# Patient Record
Sex: FEMALE | Race: WHITE | Employment: OTHER
[De-identification: names, ages, dates, MRNs, and addresses within clinical notes are randomized per-mention and may not be internally consistent; named-entity substitution may affect disease eponyms.]

---

## 2023-10-31 ENCOUNTER — APPOINTMENT (OUTPATIENT)
Facility: HOSPITAL | Age: 63
DRG: 481 | End: 2023-10-31
Payer: COMMERCIAL

## 2023-10-31 ENCOUNTER — HOSPITAL ENCOUNTER (INPATIENT)
Facility: HOSPITAL | Age: 63
LOS: 5 days | Discharge: HOME HEALTH | DRG: 481 | End: 2023-11-05
Attending: EMERGENCY MEDICINE | Admitting: STUDENT IN AN ORGANIZED HEALTH CARE EDUCATION/TRAINING PROGRAM
Payer: COMMERCIAL

## 2023-10-31 DIAGNOSIS — S72.432B: Primary | ICD-10-CM

## 2023-10-31 DIAGNOSIS — S72.422B: Primary | ICD-10-CM

## 2023-10-31 DIAGNOSIS — E87.1 HYPONATREMIA: ICD-10-CM

## 2023-10-31 DIAGNOSIS — G89.18 ACUTE POST-OPERATIVE PAIN: ICD-10-CM

## 2023-10-31 PROBLEM — S72.442B: Status: ACTIVE | Noted: 2023-10-31

## 2023-10-31 PROBLEM — S72.352D CLOSED DISPLACED COMMINUTED FRACTURE OF SHAFT OF LEFT FEMUR WITH ROUTINE HEALING: Status: ACTIVE | Noted: 2023-10-31

## 2023-10-31 LAB
ALBUMIN SERPL-MCNC: 3.9 G/DL (ref 3.5–5)
ALBUMIN/GLOB SERPL: 1.1 (ref 1.1–2.2)
ALP SERPL-CCNC: 82 U/L (ref 45–117)
ALT SERPL-CCNC: 31 U/L (ref 12–78)
ANION GAP SERPL CALC-SCNC: 6 MMOL/L (ref 5–15)
APTT PPP: 25.6 SEC (ref 22.1–31)
AST SERPL-CCNC: 21 U/L (ref 15–37)
BASOPHILS # BLD: 0 K/UL (ref 0–0.1)
BASOPHILS NFR BLD: 0 % (ref 0–1)
BILIRUB SERPL-MCNC: 0.5 MG/DL (ref 0.2–1)
BUN SERPL-MCNC: 7 MG/DL (ref 6–20)
BUN/CREAT SERPL: 12 (ref 12–20)
CALCIUM SERPL-MCNC: 8.9 MG/DL (ref 8.5–10.1)
CHLORIDE SERPL-SCNC: 98 MMOL/L (ref 97–108)
CO2 SERPL-SCNC: 26 MMOL/L (ref 21–32)
COMMENT:: NORMAL
CREAT SERPL-MCNC: 0.58 MG/DL (ref 0.55–1.02)
DIFFERENTIAL METHOD BLD: ABNORMAL
EOSINOPHIL # BLD: 0.1 K/UL (ref 0–0.4)
EOSINOPHIL NFR BLD: 1 % (ref 0–7)
ERYTHROCYTE [DISTWIDTH] IN BLOOD BY AUTOMATED COUNT: 12.3 % (ref 11.5–14.5)
GLOBULIN SER CALC-MCNC: 3.6 G/DL (ref 2–4)
GLUCOSE BLD STRIP.AUTO-MCNC: 289 MG/DL (ref 65–117)
GLUCOSE BLD STRIP.AUTO-MCNC: 296 MG/DL (ref 65–117)
GLUCOSE BLD STRIP.AUTO-MCNC: 323 MG/DL (ref 65–117)
GLUCOSE SERPL-MCNC: 178 MG/DL (ref 65–100)
HCT VFR BLD AUTO: 33.3 % (ref 35–47)
HGB BLD-MCNC: 11.8 G/DL (ref 11.5–16)
IMM GRANULOCYTES # BLD AUTO: 0 K/UL (ref 0–0.04)
IMM GRANULOCYTES NFR BLD AUTO: 0 % (ref 0–0.5)
INR PPP: 1 (ref 0.9–1.1)
LYMPHOCYTES # BLD: 0.9 K/UL (ref 0.8–3.5)
LYMPHOCYTES NFR BLD: 12 % (ref 12–49)
MAGNESIUM SERPL-MCNC: 2 MG/DL (ref 1.6–2.4)
MCH RBC QN AUTO: 29.9 PG (ref 26–34)
MCHC RBC AUTO-ENTMCNC: 35.4 G/DL (ref 30–36.5)
MCV RBC AUTO: 84.3 FL (ref 80–99)
MONOCYTES # BLD: 0.4 K/UL (ref 0–1)
MONOCYTES NFR BLD: 6 % (ref 5–13)
NEUTS SEG # BLD: 6.1 K/UL (ref 1.8–8)
NEUTS SEG NFR BLD: 81 % (ref 32–75)
NRBC # BLD: 0 K/UL (ref 0–0.01)
NRBC BLD-RTO: 0 PER 100 WBC
PLATELET # BLD AUTO: 191 K/UL (ref 150–400)
PMV BLD AUTO: 10.1 FL (ref 8.9–12.9)
POTASSIUM SERPL-SCNC: 3.9 MMOL/L (ref 3.5–5.1)
PROT SERPL-MCNC: 7.5 G/DL (ref 6.4–8.2)
PROTHROMBIN TIME: 10.3 SEC (ref 9–11.1)
RBC # BLD AUTO: 3.95 M/UL (ref 3.8–5.2)
SERVICE CMNT-IMP: ABNORMAL
SODIUM SERPL-SCNC: 130 MMOL/L (ref 136–145)
SPECIMEN HOLD: NORMAL
THERAPEUTIC RANGE: NORMAL SECS (ref 58–77)
WBC # BLD AUTO: 7.5 K/UL (ref 3.6–11)

## 2023-10-31 PROCEDURE — 36415 COLL VENOUS BLD VENIPUNCTURE: CPT

## 2023-10-31 PROCEDURE — 27502 TREATMENT OF THIGH FRACTURE: CPT

## 2023-10-31 PROCEDURE — 2500000003 HC RX 250 WO HCPCS

## 2023-10-31 PROCEDURE — 85610 PROTHROMBIN TIME: CPT

## 2023-10-31 PROCEDURE — 2580000003 HC RX 258

## 2023-10-31 PROCEDURE — 85025 COMPLETE CBC W/AUTO DIFF WBC: CPT

## 2023-10-31 PROCEDURE — 6370000000 HC RX 637 (ALT 250 FOR IP)

## 2023-10-31 PROCEDURE — 6360000002 HC RX W HCPCS

## 2023-10-31 PROCEDURE — 6370000000 HC RX 637 (ALT 250 FOR IP): Performed by: STUDENT IN AN ORGANIZED HEALTH CARE EDUCATION/TRAINING PROGRAM

## 2023-10-31 PROCEDURE — 73560 X-RAY EXAM OF KNEE 1 OR 2: CPT

## 2023-10-31 PROCEDURE — 90471 IMMUNIZATION ADMIN: CPT

## 2023-10-31 PROCEDURE — 96375 TX/PRO/DX INJ NEW DRUG ADDON: CPT

## 2023-10-31 PROCEDURE — 96365 THER/PROPH/DIAG IV INF INIT: CPT

## 2023-10-31 PROCEDURE — 6360000002 HC RX W HCPCS: Performed by: EMERGENCY MEDICINE

## 2023-10-31 PROCEDURE — 73706 CT ANGIO LWR EXTR W/O&W/DYE: CPT

## 2023-10-31 PROCEDURE — 99285 EMERGENCY DEPT VISIT HI MDM: CPT

## 2023-10-31 PROCEDURE — 1100000003 HC PRIVATE W/ TELEMETRY

## 2023-10-31 PROCEDURE — 80053 COMPREHEN METABOLIC PANEL: CPT

## 2023-10-31 PROCEDURE — 2580000003 HC RX 258: Performed by: STUDENT IN AN ORGANIZED HEALTH CARE EDUCATION/TRAINING PROGRAM

## 2023-10-31 PROCEDURE — 82962 GLUCOSE BLOOD TEST: CPT

## 2023-10-31 PROCEDURE — 73552 X-RAY EXAM OF FEMUR 2/>: CPT

## 2023-10-31 PROCEDURE — 6360000002 HC RX W HCPCS: Performed by: STUDENT IN AN ORGANIZED HEALTH CARE EDUCATION/TRAINING PROGRAM

## 2023-10-31 PROCEDURE — 2580000003 HC RX 258: Performed by: EMERGENCY MEDICINE

## 2023-10-31 PROCEDURE — 6360000004 HC RX CONTRAST MEDICATION: Performed by: EMERGENCY MEDICINE

## 2023-10-31 PROCEDURE — 96366 THER/PROPH/DIAG IV INF ADDON: CPT

## 2023-10-31 PROCEDURE — 83735 ASSAY OF MAGNESIUM: CPT

## 2023-10-31 PROCEDURE — 85730 THROMBOPLASTIN TIME PARTIAL: CPT

## 2023-10-31 PROCEDURE — 93005 ELECTROCARDIOGRAM TRACING: CPT

## 2023-10-31 PROCEDURE — 90714 TD VACC NO PRESV 7 YRS+ IM: CPT

## 2023-10-31 RX ORDER — SODIUM CHLORIDE 9 MG/ML
INJECTION, SOLUTION INTRAVENOUS PRN
Status: DISCONTINUED | OUTPATIENT
Start: 2023-10-31 | End: 2023-11-04 | Stop reason: SDUPTHER

## 2023-10-31 RX ORDER — FENTANYL CITRATE 50 UG/ML
50 INJECTION, SOLUTION INTRAMUSCULAR; INTRAVENOUS
Status: COMPLETED | OUTPATIENT
Start: 2023-10-31 | End: 2023-10-31

## 2023-10-31 RX ORDER — SODIUM CHLORIDE 9 MG/ML
125 INJECTION, SOLUTION INTRAVENOUS ONCE
Status: COMPLETED | OUTPATIENT
Start: 2023-10-31 | End: 2023-10-31

## 2023-10-31 RX ORDER — LORAZEPAM 2 MG/ML
2 INJECTION INTRAMUSCULAR EVERY 6 HOURS PRN
Status: DISCONTINUED | OUTPATIENT
Start: 2023-10-31 | End: 2023-11-05 | Stop reason: HOSPADM

## 2023-10-31 RX ORDER — DULOXETIN HYDROCHLORIDE 60 MG/1
60 CAPSULE, DELAYED RELEASE ORAL DAILY
COMMUNITY

## 2023-10-31 RX ORDER — ACETAMINOPHEN 325 MG/1
650 TABLET ORAL EVERY 6 HOURS PRN
Status: DISCONTINUED | OUTPATIENT
Start: 2023-10-31 | End: 2023-11-05 | Stop reason: HOSPADM

## 2023-10-31 RX ORDER — GABAPENTIN 100 MG/1
1 CAPSULE ORAL 2 TIMES DAILY
COMMUNITY

## 2023-10-31 RX ORDER — ENOXAPARIN SODIUM 100 MG/ML
40 INJECTION SUBCUTANEOUS DAILY
Status: DISCONTINUED | OUTPATIENT
Start: 2023-10-31 | End: 2023-11-01

## 2023-10-31 RX ORDER — SODIUM CHLORIDE 0.9 % (FLUSH) 0.9 %
5-40 SYRINGE (ML) INJECTION EVERY 12 HOURS SCHEDULED
Status: DISCONTINUED | OUTPATIENT
Start: 2023-10-31 | End: 2023-11-01

## 2023-10-31 RX ORDER — ONDANSETRON 2 MG/ML
4 INJECTION INTRAMUSCULAR; INTRAVENOUS EVERY 6 HOURS PRN
Status: DISCONTINUED | OUTPATIENT
Start: 2023-10-31 | End: 2023-11-05 | Stop reason: HOSPADM

## 2023-10-31 RX ORDER — TETANUS AND DIPHTHERIA TOXOIDS ADSORBED 2; 2 [LF]/.5ML; [LF]/.5ML
0.5 INJECTION INTRAMUSCULAR ONCE
Status: COMPLETED | OUTPATIENT
Start: 2023-10-31 | End: 2023-10-31

## 2023-10-31 RX ORDER — SODIUM CHLORIDE 0.9 % (FLUSH) 0.9 %
5-40 SYRINGE (ML) INJECTION PRN
Status: DISCONTINUED | OUTPATIENT
Start: 2023-10-31 | End: 2023-11-05 | Stop reason: HOSPADM

## 2023-10-31 RX ORDER — DIAZEPAM 5 MG/ML
2.5 INJECTION, SOLUTION INTRAMUSCULAR; INTRAVENOUS EVERY 4 HOURS PRN
Status: DISCONTINUED | OUTPATIENT
Start: 2023-10-31 | End: 2023-11-05 | Stop reason: HOSPADM

## 2023-10-31 RX ORDER — DEXTROSE MONOHYDRATE 100 MG/ML
INJECTION, SOLUTION INTRAVENOUS CONTINUOUS PRN
Status: DISCONTINUED | OUTPATIENT
Start: 2023-10-31 | End: 2023-11-05 | Stop reason: HOSPADM

## 2023-10-31 RX ORDER — GAUZE BANDAGE 2" X 2"
100 BANDAGE TOPICAL DAILY
Status: DISCONTINUED | OUTPATIENT
Start: 2023-10-31 | End: 2023-11-05 | Stop reason: HOSPADM

## 2023-10-31 RX ORDER — TRAZODONE HYDROCHLORIDE 100 MG/1
100 TABLET ORAL NIGHTLY
COMMUNITY

## 2023-10-31 RX ORDER — DIAZEPAM 5 MG/1
5 TABLET ORAL ONCE
Status: COMPLETED | OUTPATIENT
Start: 2023-10-31 | End: 2023-10-31

## 2023-10-31 RX ORDER — SODIUM CHLORIDE 9 MG/ML
INJECTION, SOLUTION INTRAVENOUS PRN
Status: DISCONTINUED | OUTPATIENT
Start: 2023-10-31 | End: 2023-11-05 | Stop reason: HOSPADM

## 2023-10-31 RX ORDER — SODIUM CHLORIDE 0.9 % (FLUSH) 0.9 %
5-40 SYRINGE (ML) INJECTION EVERY 12 HOURS SCHEDULED
Status: DISCONTINUED | OUTPATIENT
Start: 2023-10-31 | End: 2023-11-05 | Stop reason: HOSPADM

## 2023-10-31 RX ORDER — TRAZODONE HYDROCHLORIDE 100 MG/1
100 TABLET ORAL NIGHTLY
Status: DISCONTINUED | OUTPATIENT
Start: 2023-10-31 | End: 2023-11-01

## 2023-10-31 RX ORDER — MORPHINE SULFATE 4 MG/ML
4 INJECTION, SOLUTION INTRAMUSCULAR; INTRAVENOUS EVERY 4 HOURS PRN
Status: DISCONTINUED | OUTPATIENT
Start: 2023-10-31 | End: 2023-11-05 | Stop reason: HOSPADM

## 2023-10-31 RX ORDER — 0.9 % SODIUM CHLORIDE 0.9 %
1000 INTRAVENOUS SOLUTION INTRAVENOUS ONCE
Status: COMPLETED | OUTPATIENT
Start: 2023-10-31 | End: 2023-10-31

## 2023-10-31 RX ORDER — ONDANSETRON 4 MG/1
4 TABLET, ORALLY DISINTEGRATING ORAL EVERY 8 HOURS PRN
Status: DISCONTINUED | OUTPATIENT
Start: 2023-10-31 | End: 2023-11-05 | Stop reason: HOSPADM

## 2023-10-31 RX ORDER — ATORVASTATIN CALCIUM 20 MG/1
20 TABLET, FILM COATED ORAL NIGHTLY
Status: DISCONTINUED | OUTPATIENT
Start: 2023-10-31 | End: 2023-11-05 | Stop reason: HOSPADM

## 2023-10-31 RX ORDER — NICOTINE 21 MG/24HR
1 PATCH, TRANSDERMAL 24 HOURS TRANSDERMAL DAILY
Status: DISCONTINUED | OUTPATIENT
Start: 2023-10-31 | End: 2023-11-05 | Stop reason: HOSPADM

## 2023-10-31 RX ORDER — DIAZEPAM 5 MG/1
5 TABLET ORAL EVERY 6 HOURS PRN
Status: DISCONTINUED | OUTPATIENT
Start: 2023-10-31 | End: 2023-10-31

## 2023-10-31 RX ORDER — ONDANSETRON 2 MG/ML
4 INJECTION INTRAMUSCULAR; INTRAVENOUS EVERY 4 HOURS PRN
Status: DISCONTINUED | OUTPATIENT
Start: 2023-10-31 | End: 2023-11-05 | Stop reason: HOSPADM

## 2023-10-31 RX ORDER — POLYETHYLENE GLYCOL 3350 17 G/17G
17 POWDER, FOR SOLUTION ORAL DAILY PRN
Status: DISCONTINUED | OUTPATIENT
Start: 2023-10-31 | End: 2023-11-05 | Stop reason: HOSPADM

## 2023-10-31 RX ORDER — DULOXETIN HYDROCHLORIDE 30 MG/1
60 CAPSULE, DELAYED RELEASE ORAL DAILY
Status: DISCONTINUED | OUTPATIENT
Start: 2023-10-31 | End: 2023-11-05 | Stop reason: HOSPADM

## 2023-10-31 RX ORDER — SIMVASTATIN 10 MG
10 TABLET ORAL NIGHTLY
COMMUNITY

## 2023-10-31 RX ORDER — VALSARTAN 160 MG/1
160 TABLET ORAL DAILY
COMMUNITY

## 2023-10-31 RX ORDER — VALSARTAN 160 MG/1
160 TABLET ORAL DAILY
Status: DISCONTINUED | OUTPATIENT
Start: 2023-10-31 | End: 2023-11-05 | Stop reason: HOSPADM

## 2023-10-31 RX ORDER — KETAMINE HYDROCHLORIDE 10 MG/ML
0.5 INJECTION INTRAMUSCULAR; INTRAVENOUS ONCE
Status: COMPLETED | OUTPATIENT
Start: 2023-10-31 | End: 2023-10-31

## 2023-10-31 RX ORDER — GABAPENTIN 100 MG/1
100 CAPSULE ORAL 3 TIMES DAILY
Status: DISCONTINUED | OUTPATIENT
Start: 2023-10-31 | End: 2023-11-01

## 2023-10-31 RX ORDER — ACETAMINOPHEN 650 MG/1
650 SUPPOSITORY RECTAL EVERY 6 HOURS PRN
Status: DISCONTINUED | OUTPATIENT
Start: 2023-10-31 | End: 2023-11-05 | Stop reason: HOSPADM

## 2023-10-31 RX ADMIN — Medication 100 MG: at 18:34

## 2023-10-31 RX ADMIN — ATORVASTATIN CALCIUM 20 MG: 20 TABLET, FILM COATED ORAL at 20:05

## 2023-10-31 RX ADMIN — ENOXAPARIN SODIUM 40 MG: 100 INJECTION SUBCUTANEOUS at 18:33

## 2023-10-31 RX ADMIN — PROPOFOL 34.7 MG: 10 INJECTION, EMULSION INTRAVENOUS at 16:34

## 2023-10-31 RX ADMIN — MORPHINE SULFATE 4 MG: 4 INJECTION, SOLUTION INTRAMUSCULAR; INTRAVENOUS at 23:56

## 2023-10-31 RX ADMIN — SODIUM CHLORIDE 1000 ML: 9 INJECTION, SOLUTION INTRAVENOUS at 13:34

## 2023-10-31 RX ADMIN — PIPERACILLIN AND TAZOBACTAM 3375 MG: 3; .375 INJECTION, POWDER, LYOPHILIZED, FOR SOLUTION INTRAVENOUS at 22:48

## 2023-10-31 RX ADMIN — DULOXETINE HYDROCHLORIDE 60 MG: 30 CAPSULE, DELAYED RELEASE ORAL at 18:34

## 2023-10-31 RX ADMIN — SODIUM CHLORIDE, PRESERVATIVE FREE 10 ML: 5 INJECTION INTRAVENOUS at 20:13

## 2023-10-31 RX ADMIN — KETAMINE HYDROCHLORIDE 34.7 MG: 10 INJECTION, SOLUTION INTRAMUSCULAR; INTRAVENOUS at 16:27

## 2023-10-31 RX ADMIN — DIAZEPAM 5 MG: 5 TABLET ORAL at 13:43

## 2023-10-31 RX ADMIN — MORPHINE SULFATE 4 MG: 4 INJECTION, SOLUTION INTRAMUSCULAR; INTRAVENOUS at 19:43

## 2023-10-31 RX ADMIN — TETANUS AND DIPHTHERIA TOXOIDS ADSORBED 0.5 ML: 2; 2 INJECTION INTRAMUSCULAR at 13:52

## 2023-10-31 RX ADMIN — FENTANYL CITRATE 50 MCG: 50 INJECTION INTRAMUSCULAR; INTRAVENOUS at 13:36

## 2023-10-31 RX ADMIN — PIPERACILLIN AND TAZOBACTAM 3375 MG: 3; .375 INJECTION, POWDER, LYOPHILIZED, FOR SOLUTION INTRAVENOUS at 13:44

## 2023-10-31 RX ADMIN — VALSARTAN 160 MG: 160 TABLET, FILM COATED ORAL at 18:34

## 2023-10-31 RX ADMIN — IOPAMIDOL 100 ML: 755 INJECTION, SOLUTION INTRAVENOUS at 14:26

## 2023-10-31 RX ADMIN — SODIUM CHLORIDE 125 ML/HR: 9 INJECTION, SOLUTION INTRAVENOUS at 17:53

## 2023-10-31 ASSESSMENT — PAIN DESCRIPTION - LOCATION
LOCATION: LEG
LOCATION: LEG

## 2023-10-31 ASSESSMENT — PAIN DESCRIPTION - DESCRIPTORS
DESCRIPTORS: DULL;THROBBING
DESCRIPTORS: SHARP;ACHING

## 2023-10-31 ASSESSMENT — PAIN SCALES - GENERAL
PAINLEVEL_OUTOF10: 7
PAINLEVEL_OUTOF10: 4
PAINLEVEL_OUTOF10: 10
PAINLEVEL_OUTOF10: 8

## 2023-10-31 ASSESSMENT — PAIN - FUNCTIONAL ASSESSMENT: PAIN_FUNCTIONAL_ASSESSMENT: 0-10

## 2023-10-31 ASSESSMENT — PAIN DESCRIPTION - ORIENTATION
ORIENTATION: LEFT
ORIENTATION: LEFT

## 2023-10-31 NOTE — CONSULTS
Orthopedic CONSULT NOTE    Subjective:     Date of Consultation:  October 31, 2023      Chief Complaint   Patient presents with    Fall       Patient arrives w EMS after suffering a GLF and being outside for about 30 min. Patient has an obvious deformity to the L knee. HPI: History From: EMS, pt's niece, and patient, History limited by: none  Andry Jason, 58 y.o. female with hx of DM who presents to the ED via EMS with cc of left knee pain post fall that occurred at approximately 1030 this morning. She reports that she was ambulating outside across concrete pavers when she tripped and fell resulting in immediate pain and obvious deformity to left knee. Patient then crawled across the grass into the house to find her niece who then contacted EMS. She was outside for approximately 30 min. Patient did not hit her head and did not experience LOC, she is not on anticoagulation. Pt's niece reports that a \"chunk of the patient's bone fell out\" while attempting to help her up. Patient states that her tetanus is not up-to-date. Notes that she smokes at least 1 pack/day of cigarettes and has prior significant alcohol use, typically consuming 1 fifth of bourbon daily. She has withdrawn from alcohol in the past, niece states that patient was extremely tremulous prior to arrival, prior to injury with normal glucose. Objective:     EXAM: alert, cooperative, no distress, oriented, normal, normal mood,   Pt. Is TTP over left distal femur with swelling and deformity, 2 cm wound lateral wound is clean and free of debris . Spine and Scalp w/o step off TTP or deformity. Compartments are soft and compressible , no signs of compartment syndrome Capillary refill <2 secs in bilateral  Fingers and toes bilaterally, Sensation intact in bilateralFingers and toes bilaterally. Pulses 2+ in upper/lower extremities.     Imaging Review:   I have independently reviewed and interpreted x-rays and CT of left femur which

## 2023-10-31 NOTE — ED PROVIDER NOTES
Procedure Note - Procedural Sedation:     Indication:  Procedural sedation is required to perform the following procedure:  Placement of knee immobilizer in setting of distal femur fx    Informed Consent:  The reason for the procedure as well as the risks, benefits, and alternatives to the procedure have been explained to the patient and She consents to the procedure. The consent for sedation has been signed by the patient/representative, the medical provider and witnessed by the patient's nurse. Anesthesia Risks: The ASA score is ASA 2 - Mild systemic disease    Mallampati Score Reference: The patient has a patent airway with a Mallampati Classification Score of II (soft palate, uvula, fauces visible). Sedation was performed by:   Torrie May MD  Procedure was performed by:  Daniel Salomon    Pre-Procedure Sedation Assessment:  Sedation Start Time: 9700    Time Out was performed to confirm patient identity, laterality, indication, and contraindications, prior to procedural sedation. Post-Procedure Sedation Assessment:   Sedation End Time: 1602  The patient was sedated with a total of 30 mg propofol/DIPRIVAN and 30mg  ketamine  via IV. Reversal agents and resuscitation equipment were at the bedside. Reversal agents were not required. The indicated procedure was completed and the patient tolerated the sedation well with no complications. Please refer to sedation flowsheet for nursing notes, vital signs, and specific timeline details.     Torrie May MD  4:11 PM       Torrie May MD  Resident  10/31/23 9165

## 2023-10-31 NOTE — ED PROVIDER NOTES
Please see H&P documented by Dr. Kristin Garcia, ED resident.   Second note started in error     Imelda Hawkins MD  10/31/23 5035

## 2023-10-31 NOTE — ED NOTES
Immature Granulocytes 0 0.0 - 0.5 %    Neutrophils Absolute 6.1 1.8 - 8.0 K/UL    Lymphocytes Absolute 0.9 0.8 - 3.5 K/UL    Monocytes Absolute 0.4 0.0 - 1.0 K/UL    Eosinophils Absolute 0.1 0.0 - 0.4 K/UL    Basophils Absolute 0.0 0.0 - 0.1 K/UL    Absolute Immature Granulocyte 0.0 0.00 - 0.04 K/UL    Differential Type AUTOMATED     CMP    Collection Time: 10/31/23  1:51 PM   Result Value Ref Range    Sodium 130 (L) 136 - 145 mmol/L    Potassium 3.9 3.5 - 5.1 mmol/L    Chloride 98 97 - 108 mmol/L    CO2 26 21 - 32 mmol/L    Anion Gap 6 5 - 15 mmol/L    Glucose 178 (H) 65 - 100 mg/dL    BUN 7 6 - 20 MG/DL    Creatinine 0.58 0.55 - 1.02 MG/DL    Bun/Cre Ratio 12 12 - 20      Est, Glom Filt Rate >60 >60 ml/min/1.73m2    Calcium 8.9 8.5 - 10.1 MG/DL    Total Bilirubin 0.5 0.2 - 1.0 MG/DL    ALT 31 12 - 78 U/L    AST 21 15 - 37 U/L    Alk Phosphatase 82 45 - 117 U/L    Total Protein 7.5 6.4 - 8.2 g/dL    Albumin 3.9 3.5 - 5.0 g/dL    Globulin 3.6 2.0 - 4.0 g/dL    Albumin/Globulin Ratio 1.1 1.1 - 2.2     Magnesium    Collection Time: 10/31/23  1:51 PM   Result Value Ref Range    Magnesium 2.0 1.6 - 2.4 mg/dL   Protime-INR    Collection Time: 10/31/23  1:51 PM   Result Value Ref Range    INR 1.0 0.9 - 1.1      Protime 10.3 9.0 - 11.1 sec   APTT    Collection Time: 10/31/23  1:51 PM   Result Value Ref Range    PTT 25.6 22.1 - 31.0 sec    Therapeutic Range   58.0 - 77.0 SECS   Extra Tubes Hold    Collection Time: 10/31/23  1:51 PM   Result Value Ref Range    Specimen HOld RED, PST, LAV     Comment:        Add-on orders for these samples will be processed based on acceptable specimen integrity and analyte stability, which may vary by analyte.    EKG 12 Lead    Collection Time: 10/31/23  3:42 PM   Result Value Ref Range    Ventricular Rate 81 BPM    Atrial Rate 81 BPM    P-R Interval 160 ms    QRS Duration 78 ms    Q-T Interval 386 ms    QTc Calculation (Bazett) 448 ms    P Axis 73 degrees    R Axis 45 degrees    T Axis 77

## 2023-11-01 ENCOUNTER — APPOINTMENT (OUTPATIENT)
Facility: HOSPITAL | Age: 63
DRG: 481 | End: 2023-11-01
Payer: COMMERCIAL

## 2023-11-01 ENCOUNTER — ANESTHESIA EVENT (OUTPATIENT)
Facility: HOSPITAL | Age: 63
End: 2023-11-01
Payer: COMMERCIAL

## 2023-11-01 ENCOUNTER — ANESTHESIA (OUTPATIENT)
Facility: HOSPITAL | Age: 63
End: 2023-11-01
Payer: COMMERCIAL

## 2023-11-01 PROBLEM — S72.422B: Status: ACTIVE | Noted: 2023-11-01

## 2023-11-01 PROBLEM — S72.432B: Status: ACTIVE | Noted: 2023-11-01

## 2023-11-01 LAB
ALBUMIN SERPL-MCNC: 3 G/DL (ref 3.5–5)
ALBUMIN/GLOB SERPL: 1 (ref 1.1–2.2)
ALP SERPL-CCNC: 52 U/L (ref 45–117)
ALT SERPL-CCNC: 22 U/L (ref 12–78)
ANION GAP SERPL CALC-SCNC: 6 MMOL/L (ref 5–15)
AST SERPL-CCNC: 13 U/L (ref 15–37)
BASOPHILS # BLD: 0 K/UL (ref 0–0.1)
BASOPHILS NFR BLD: 0 % (ref 0–1)
BILIRUB SERPL-MCNC: 0.5 MG/DL (ref 0.2–1)
BUN SERPL-MCNC: 8 MG/DL (ref 6–20)
BUN/CREAT SERPL: 17 (ref 12–20)
CALCIUM SERPL-MCNC: 7.9 MG/DL (ref 8.5–10.1)
CHLORIDE SERPL-SCNC: 101 MMOL/L (ref 97–108)
CO2 SERPL-SCNC: 25 MMOL/L (ref 21–32)
COMMENT:: NORMAL
CREAT SERPL-MCNC: 0.47 MG/DL (ref 0.55–1.02)
DIFFERENTIAL METHOD BLD: ABNORMAL
EKG ATRIAL RATE: 81 BPM
EKG DIAGNOSIS: NORMAL
EKG P AXIS: 73 DEGREES
EKG P-R INTERVAL: 160 MS
EKG Q-T INTERVAL: 386 MS
EKG QRS DURATION: 78 MS
EKG QTC CALCULATION (BAZETT): 448 MS
EKG R AXIS: 45 DEGREES
EKG T AXIS: 77 DEGREES
EKG VENTRICULAR RATE: 81 BPM
EOSINOPHIL # BLD: 0 K/UL (ref 0–0.4)
EOSINOPHIL NFR BLD: 0 % (ref 0–7)
ERYTHROCYTE [DISTWIDTH] IN BLOOD BY AUTOMATED COUNT: 12.8 % (ref 11.5–14.5)
GLOBULIN SER CALC-MCNC: 3.1 G/DL (ref 2–4)
GLUCOSE BLD STRIP.AUTO-MCNC: 174 MG/DL (ref 65–117)
GLUCOSE BLD STRIP.AUTO-MCNC: 186 MG/DL (ref 65–117)
GLUCOSE BLD STRIP.AUTO-MCNC: 197 MG/DL (ref 65–117)
GLUCOSE BLD STRIP.AUTO-MCNC: 200 MG/DL (ref 65–117)
GLUCOSE BLD STRIP.AUTO-MCNC: 214 MG/DL (ref 65–117)
GLUCOSE BLD STRIP.AUTO-MCNC: 247 MG/DL (ref 65–117)
GLUCOSE SERPL-MCNC: 173 MG/DL (ref 65–100)
HCT VFR BLD AUTO: 23.1 % (ref 35–47)
HGB BLD-MCNC: 8.1 G/DL (ref 11.5–16)
IMM GRANULOCYTES # BLD AUTO: 0.1 K/UL (ref 0–0.04)
IMM GRANULOCYTES NFR BLD AUTO: 1 % (ref 0–0.5)
LYMPHOCYTES # BLD: 1.2 K/UL (ref 0.8–3.5)
LYMPHOCYTES NFR BLD: 14 % (ref 12–49)
MAGNESIUM SERPL-MCNC: 1.9 MG/DL (ref 1.6–2.4)
MCH RBC QN AUTO: 30.1 PG (ref 26–34)
MCHC RBC AUTO-ENTMCNC: 35.1 G/DL (ref 30–36.5)
MCV RBC AUTO: 85.9 FL (ref 80–99)
MONOCYTES # BLD: 0.6 K/UL (ref 0–1)
MONOCYTES NFR BLD: 7 % (ref 5–13)
NEUTS SEG # BLD: 6.8 K/UL (ref 1.8–8)
NEUTS SEG NFR BLD: 79 % (ref 32–75)
NRBC # BLD: 0 K/UL (ref 0–0.01)
NRBC BLD-RTO: 0 PER 100 WBC
PLATELET # BLD AUTO: 183 K/UL (ref 150–400)
PMV BLD AUTO: 9.9 FL (ref 8.9–12.9)
POTASSIUM SERPL-SCNC: 3.5 MMOL/L (ref 3.5–5.1)
PROT SERPL-MCNC: 6.1 G/DL (ref 6.4–8.2)
RBC # BLD AUTO: 2.69 M/UL (ref 3.8–5.2)
SERVICE CMNT-IMP: ABNORMAL
SODIUM SERPL-SCNC: 132 MMOL/L (ref 136–145)
SPECIMEN HOLD: NORMAL
WBC # BLD AUTO: 8.6 K/UL (ref 3.6–11)

## 2023-11-01 PROCEDURE — 2580000003 HC RX 258: Performed by: ORTHOPAEDIC SURGERY

## 2023-11-01 PROCEDURE — 6370000000 HC RX 637 (ALT 250 FOR IP): Performed by: ORTHOPAEDIC SURGERY

## 2023-11-01 PROCEDURE — 36415 COLL VENOUS BLD VENIPUNCTURE: CPT

## 2023-11-01 PROCEDURE — 3700000000 HC ANESTHESIA ATTENDED CARE: Performed by: ORTHOPAEDIC SURGERY

## 2023-11-01 PROCEDURE — 0QS904Z REPOSITION LEFT FEMORAL SHAFT WITH INTERNAL FIXATION DEVICE, OPEN APPROACH: ICD-10-PCS | Performed by: ORTHOPAEDIC SURGERY

## 2023-11-01 PROCEDURE — 82962 GLUCOSE BLOOD TEST: CPT

## 2023-11-01 PROCEDURE — 6360000002 HC RX W HCPCS: Performed by: ORTHOPAEDIC SURGERY

## 2023-11-01 PROCEDURE — 2720000010 HC SURG SUPPLY STERILE: Performed by: ORTHOPAEDIC SURGERY

## 2023-11-01 PROCEDURE — 7100000000 HC PACU RECOVERY - FIRST 15 MIN: Performed by: ORTHOPAEDIC SURGERY

## 2023-11-01 PROCEDURE — 2500000003 HC RX 250 WO HCPCS

## 2023-11-01 PROCEDURE — C1769 GUIDE WIRE: HCPCS | Performed by: ORTHOPAEDIC SURGERY

## 2023-11-01 PROCEDURE — 80053 COMPREHEN METABOLIC PANEL: CPT

## 2023-11-01 PROCEDURE — 3600000014 HC SURGERY LEVEL 4 ADDTL 15MIN: Performed by: ORTHOPAEDIC SURGERY

## 2023-11-01 PROCEDURE — 99221 1ST HOSP IP/OBS SF/LOW 40: CPT

## 2023-11-01 PROCEDURE — 6360000002 HC RX W HCPCS

## 2023-11-01 PROCEDURE — 6370000000 HC RX 637 (ALT 250 FOR IP): Performed by: INTERNAL MEDICINE

## 2023-11-01 PROCEDURE — 83735 ASSAY OF MAGNESIUM: CPT

## 2023-11-01 PROCEDURE — 6360000002 HC RX W HCPCS: Performed by: EMERGENCY MEDICINE

## 2023-11-01 PROCEDURE — 3600000004 HC SURGERY LEVEL 4 BASE: Performed by: ORTHOPAEDIC SURGERY

## 2023-11-01 PROCEDURE — 2580000003 HC RX 258

## 2023-11-01 PROCEDURE — 7100000001 HC PACU RECOVERY - ADDTL 15 MIN: Performed by: ORTHOPAEDIC SURGERY

## 2023-11-01 PROCEDURE — 1100000003 HC PRIVATE W/ TELEMETRY

## 2023-11-01 PROCEDURE — L1830 KO IMMOB CANVAS LONG PRE OTS: HCPCS | Performed by: ORTHOPAEDIC SURGERY

## 2023-11-01 PROCEDURE — 6370000000 HC RX 637 (ALT 250 FOR IP): Performed by: STUDENT IN AN ORGANIZED HEALTH CARE EDUCATION/TRAINING PROGRAM

## 2023-11-01 PROCEDURE — 2709999900 HC NON-CHARGEABLE SUPPLY: Performed by: ORTHOPAEDIC SURGERY

## 2023-11-01 PROCEDURE — C1713 ANCHOR/SCREW BN/BN,TIS/BN: HCPCS | Performed by: ORTHOPAEDIC SURGERY

## 2023-11-01 PROCEDURE — 3700000001 HC ADD 15 MINUTES (ANESTHESIA): Performed by: ORTHOPAEDIC SURGERY

## 2023-11-01 PROCEDURE — 85025 COMPLETE CBC W/AUTO DIFF WBC: CPT

## 2023-11-01 RX ORDER — KETAMINE HCL IN NACL, ISO-OSM 100MG/10ML
SYRINGE (ML) INJECTION PRN
Status: DISCONTINUED | OUTPATIENT
Start: 2023-11-01 | End: 2023-11-01 | Stop reason: SDUPTHER

## 2023-11-01 RX ORDER — NEOSTIGMINE METHYLSULFATE 1 MG/ML
INJECTION, SOLUTION INTRAVENOUS PRN
Status: DISCONTINUED | OUTPATIENT
Start: 2023-11-01 | End: 2023-11-01 | Stop reason: SDUPTHER

## 2023-11-01 RX ORDER — MIDAZOLAM HYDROCHLORIDE 1 MG/ML
INJECTION INTRAMUSCULAR; INTRAVENOUS PRN
Status: DISCONTINUED | OUTPATIENT
Start: 2023-11-01 | End: 2023-11-01 | Stop reason: SDUPTHER

## 2023-11-01 RX ORDER — OXYCODONE HYDROCHLORIDE 5 MG/1
10 TABLET ORAL EVERY 4 HOURS PRN
Status: DISCONTINUED | OUTPATIENT
Start: 2023-11-01 | End: 2023-11-05 | Stop reason: HOSPADM

## 2023-11-01 RX ORDER — SUCCINYLCHOLINE CHLORIDE 20 MG/ML
INJECTION INTRAMUSCULAR; INTRAVENOUS PRN
Status: DISCONTINUED | OUTPATIENT
Start: 2023-11-01 | End: 2023-11-01 | Stop reason: SDUPTHER

## 2023-11-01 RX ORDER — FENTANYL CITRATE 50 UG/ML
INJECTION, SOLUTION INTRAMUSCULAR; INTRAVENOUS PRN
Status: DISCONTINUED | OUTPATIENT
Start: 2023-11-01 | End: 2023-11-01 | Stop reason: SDUPTHER

## 2023-11-01 RX ORDER — ENOXAPARIN SODIUM 100 MG/ML
40 INJECTION SUBCUTANEOUS DAILY
Status: DISCONTINUED | OUTPATIENT
Start: 2023-11-02 | End: 2023-11-05 | Stop reason: HOSPADM

## 2023-11-01 RX ORDER — DEXAMETHASONE SODIUM PHOSPHATE 4 MG/ML
INJECTION, SOLUTION INTRA-ARTICULAR; INTRALESIONAL; INTRAMUSCULAR; INTRAVENOUS; SOFT TISSUE PRN
Status: DISCONTINUED | OUTPATIENT
Start: 2023-11-01 | End: 2023-11-01 | Stop reason: SDUPTHER

## 2023-11-01 RX ORDER — TRAZODONE HYDROCHLORIDE 100 MG/1
100 TABLET ORAL NIGHTLY
Status: DISCONTINUED | OUTPATIENT
Start: 2023-11-02 | End: 2023-11-05 | Stop reason: HOSPADM

## 2023-11-01 RX ORDER — LIDOCAINE HYDROCHLORIDE 20 MG/ML
INJECTION, SOLUTION EPIDURAL; INFILTRATION; INTRACAUDAL; PERINEURAL PRN
Status: DISCONTINUED | OUTPATIENT
Start: 2023-11-01 | End: 2023-11-01 | Stop reason: SDUPTHER

## 2023-11-01 RX ORDER — CASTOR OIL AND BALSAM, PERU 788; 87 MG/G; MG/G
OINTMENT TOPICAL 2 TIMES DAILY
Status: DISCONTINUED | OUTPATIENT
Start: 2023-11-01 | End: 2023-11-05 | Stop reason: HOSPADM

## 2023-11-01 RX ORDER — ONDANSETRON 2 MG/ML
INJECTION INTRAMUSCULAR; INTRAVENOUS PRN
Status: DISCONTINUED | OUTPATIENT
Start: 2023-11-01 | End: 2023-11-01 | Stop reason: SDUPTHER

## 2023-11-01 RX ORDER — ROCURONIUM BROMIDE 10 MG/ML
INJECTION, SOLUTION INTRAVENOUS PRN
Status: DISCONTINUED | OUTPATIENT
Start: 2023-11-01 | End: 2023-11-01 | Stop reason: SDUPTHER

## 2023-11-01 RX ORDER — GABAPENTIN 100 MG/1
100 CAPSULE ORAL 3 TIMES DAILY
Status: DISCONTINUED | OUTPATIENT
Start: 2023-11-02 | End: 2023-11-05 | Stop reason: HOSPADM

## 2023-11-01 RX ORDER — SODIUM CHLORIDE, SODIUM LACTATE, POTASSIUM CHLORIDE, CALCIUM CHLORIDE 600; 310; 30; 20 MG/100ML; MG/100ML; MG/100ML; MG/100ML
INJECTION, SOLUTION INTRAVENOUS CONTINUOUS PRN
Status: DISCONTINUED | OUTPATIENT
Start: 2023-11-01 | End: 2023-11-01 | Stop reason: SDUPTHER

## 2023-11-01 RX ORDER — GLYCOPYRROLATE 0.2 MG/ML
INJECTION INTRAMUSCULAR; INTRAVENOUS PRN
Status: DISCONTINUED | OUTPATIENT
Start: 2023-11-01 | End: 2023-11-01 | Stop reason: SDUPTHER

## 2023-11-01 RX ADMIN — OXYCODONE 10 MG: 5 TABLET ORAL at 22:40

## 2023-11-01 RX ADMIN — ONDANSETRON 4 MG: 2 INJECTION INTRAMUSCULAR; INTRAVENOUS at 17:45

## 2023-11-01 RX ADMIN — Medication 100 MG: at 09:26

## 2023-11-01 RX ADMIN — SUCCINYLCHOLINE CHLORIDE 120 MG: 20 INJECTION, SOLUTION INTRAMUSCULAR; INTRAVENOUS at 16:29

## 2023-11-01 RX ADMIN — Medication 30 MG: at 16:49

## 2023-11-01 RX ADMIN — PROPOFOL 75 MCG/KG/MIN: 10 INJECTION, EMULSION INTRAVENOUS at 16:30

## 2023-11-01 RX ADMIN — PHENYLEPHRINE HYDROCHLORIDE 80 MCG: 10 INJECTION INTRAVENOUS at 16:32

## 2023-11-01 RX ADMIN — PROPOFOL 30 MG: 10 INJECTION, EMULSION INTRAVENOUS at 18:03

## 2023-11-01 RX ADMIN — PROPOFOL 50 MG: 10 INJECTION, EMULSION INTRAVENOUS at 17:43

## 2023-11-01 RX ADMIN — OXYCODONE 10 MG: 5 TABLET ORAL at 10:10

## 2023-11-01 RX ADMIN — Medication 3 MG: at 17:45

## 2023-11-01 RX ADMIN — GLYCOPYRROLATE 0.4 MG: 0.2 INJECTION INTRAMUSCULAR; INTRAVENOUS at 17:45

## 2023-11-01 RX ADMIN — Medication: at 22:41

## 2023-11-01 RX ADMIN — DEXAMETHASONE SODIUM PHOSPHATE 10 MG: 4 INJECTION, SOLUTION INTRA-ARTICULAR; INTRALESIONAL; INTRAMUSCULAR; INTRAVENOUS; SOFT TISSUE at 16:47

## 2023-11-01 RX ADMIN — FENTANYL CITRATE 100 MCG: 50 INJECTION, SOLUTION INTRAMUSCULAR; INTRAVENOUS at 16:21

## 2023-11-01 RX ADMIN — SODIUM CHLORIDE, POTASSIUM CHLORIDE, SODIUM LACTATE AND CALCIUM CHLORIDE: 600; 310; 30; 20 INJECTION, SOLUTION INTRAVENOUS at 16:21

## 2023-11-01 RX ADMIN — PIPERACILLIN AND TAZOBACTAM 3375 MG: 3; .375 INJECTION, POWDER, LYOPHILIZED, FOR SOLUTION INTRAVENOUS at 22:50

## 2023-11-01 RX ADMIN — ATORVASTATIN CALCIUM 20 MG: 20 TABLET, FILM COATED ORAL at 22:40

## 2023-11-01 RX ADMIN — ROCURONIUM BROMIDE 20 MG: 10 INJECTION INTRAVENOUS at 16:46

## 2023-11-01 RX ADMIN — MIDAZOLAM HYDROCHLORIDE 2 MG: 1 INJECTION, SOLUTION INTRAMUSCULAR; INTRAVENOUS at 16:20

## 2023-11-01 RX ADMIN — PHENYLEPHRINE HYDROCHLORIDE 120 MCG: 10 INJECTION INTRAVENOUS at 16:43

## 2023-11-01 RX ADMIN — LIDOCAINE HYDROCHLORIDE 100 MG: 20 INJECTION, SOLUTION EPIDURAL; INFILTRATION; INTRACAUDAL; PERINEURAL at 16:21

## 2023-11-01 RX ADMIN — PIPERACILLIN AND TAZOBACTAM 3375 MG: 3; .375 INJECTION, POWDER, LYOPHILIZED, FOR SOLUTION INTRAVENOUS at 06:16

## 2023-11-01 RX ADMIN — OXYCODONE 10 MG: 5 TABLET ORAL at 14:35

## 2023-11-01 RX ADMIN — PIPERACILLIN AND TAZOBACTAM 3375 MG: 3; .375 INJECTION, POWDER, LYOPHILIZED, FOR SOLUTION INTRAVENOUS at 16:33

## 2023-11-01 RX ADMIN — PROPOFOL 150 MG: 10 INJECTION, EMULSION INTRAVENOUS at 16:29

## 2023-11-01 RX ADMIN — MORPHINE SULFATE 4 MG: 4 INJECTION, SOLUTION INTRAMUSCULAR; INTRAVENOUS at 06:19

## 2023-11-01 RX ADMIN — Medication 3 AMPULE: at 15:37

## 2023-11-01 RX ADMIN — GLYCOPYRROLATE 0.1 MG: 0.2 INJECTION INTRAMUSCULAR; INTRAVENOUS at 16:47

## 2023-11-01 RX ADMIN — DULOXETINE HYDROCHLORIDE 60 MG: 30 CAPSULE, DELAYED RELEASE ORAL at 09:26

## 2023-11-01 ASSESSMENT — PAIN SCALES - GENERAL
PAINLEVEL_OUTOF10: 4
PAINLEVEL_OUTOF10: 8
PAINLEVEL_OUTOF10: 7
PAINLEVEL_OUTOF10: 4
PAINLEVEL_OUTOF10: 4
PAINLEVEL_OUTOF10: 7
PAINLEVEL_OUTOF10: 2
PAINLEVEL_OUTOF10: 6
PAINLEVEL_OUTOF10: 7
PAINLEVEL_OUTOF10: 6

## 2023-11-01 ASSESSMENT — LIFESTYLE VARIABLES: SMOKING_STATUS: 1

## 2023-11-01 ASSESSMENT — PAIN DESCRIPTION - LOCATION
LOCATION: LEG
LOCATION: LEG;KNEE
LOCATION: LEG
LOCATION: KNEE
LOCATION: LEG;KNEE
LOCATION: LEG;KNEE

## 2023-11-01 ASSESSMENT — PAIN DESCRIPTION - ORIENTATION
ORIENTATION: LEFT

## 2023-11-01 ASSESSMENT — PAIN DESCRIPTION - DESCRIPTORS
DESCRIPTORS: ACHING
DESCRIPTORS: SHARP

## 2023-11-01 ASSESSMENT — PAIN - FUNCTIONAL ASSESSMENT: PAIN_FUNCTIONAL_ASSESSMENT: 0-10

## 2023-11-01 NOTE — CARE COORDINATION
Care Management Initial Assessment       RUR: 14%  Readmission? No      Patient is currently in town visiting her sister. Pt lives in [place]. Pt's plan is to return to her sister's home upon d/c and then return to [place] when able. CM provided SNF/IPR lists as well in the event rehab is needed upon d/c. Plan for OR today. Will have therapy eval post op.        11/01/23 1235   Service Assessment   Patient Orientation Alert and Oriented   Cognition Alert   History Provided By Patient   Primary Caregiver Self   Support Systems Family Members; [address] is: Legal Next of Kin   Prior Functional Level Independent in ADLs/IADLs   Current Functional Level Assistance with the following:  (unknown at this time. plan for surgery today)   Can patient return to prior living arrangement Yes   Family able to assist with home care needs: Yes   Would you like for me to discuss the discharge plan with any other family members/significant others, and if so, who? No   Financial Resources None   Social/Functional History   Lives With   (currently visiting sister, patient is from [place].)   Type of [address] Cane;Walker, rolling; [employer]   Active  Yes   Discharge Planning   Type of Residence House   Patient expects to be discharged to: House   Condition of Participation: Discharge Planning   The Plan for Transition of Care is related to the following treatment goals: SN/IPR   The Patient and/or Patient Representative was provided with a Choice of Provider? Patient   Freedom of Choice list was provided with basic dialogue that supports the patient's individualized plan of care/goals, treatment preferences, and shares the quality data associated with the providers?   Yes     Melissa Miller [facility], [facility] [address]

## 2023-11-01 NOTE — BRIEF OP NOTE
Brief Postoperative Note      Patient: Maliha Clemente  YOB: 1960  MRN: [id number]    Date of Procedure: 11/1/2023    Pre op diagnosis: left distal femur fracture    Post-Op Diagnosis: Same       Procedure:  Left femur retrograde nail    Surgeon(s):  Anita Doherty MD    Assistant:  Surgical Assistant: Jhonatan Lee    Anesthesia: General    Estimated Blood Loss (mL): 647     Complications: None    Specimens:   * No specimens in log *    Implants:  Implant Name Type Inv. Item Serial No.  Lot No. LRB No. Used Action   T2 ALPHA FEMUR RETROGRADE Screw/Plate/Nail/Max   [employer]_COV [id number] Left 1 Implanted   SCREW BNE LCK 5X80 MM ADV STRL - [id number]  SCREW BNE LCK 5X80 MM ADV STRL  [employer] [facility] [id number] Left 1 Implanted   SCREW BNE LCK 5X70 MM ADV STRL - [id number]  SCREW BNE LCK 5X70 MM ADV STRL  [employer] [facility] [id number] Left 2 Implanted   SCREW LK 5X60MM - [id number]  SCREW LK 5X60MM  [employer] [facility] [id number] Left 1 Implanted   SCREW BNE LCK 5X80 MM ADV STRL - [id number]  SCREW BNE LCK 5X80 MM ADV STRL  [employer] [facility] [id number] Left 1 Implanted   SCREW LK 5.0X37MM - [id number]  SCREW LK 5.0X37MM  [employer] [facility] [id number] Left 1 Implanted   SCREW BNE LK T2 ALPHA NLG SYS 4X42. 5MM - [id number]  SCREW BNE LK T2 ALPHA NLG SYS 4X42. 5MM  [employer] [facility] [id number] Left 1 Implanted         Drains: * No LDAs found *    Findings: stable fixation      Electronically signed by Anita Doherty MD on 11/1/2023 at 5:56 PM

## 2023-11-02 LAB
ALBUMIN SERPL-MCNC: 2.9 G/DL (ref 3.5–5)
ALBUMIN/GLOB SERPL: 0.9 (ref 1.1–2.2)
ALP SERPL-CCNC: 53 U/L (ref 45–117)
ALT SERPL-CCNC: 22 U/L (ref 12–78)
ANION GAP SERPL CALC-SCNC: 4 MMOL/L (ref 5–15)
AST SERPL-CCNC: 15 U/L (ref 15–37)
BASOPHILS # BLD: 0 K/UL (ref 0–0.1)
BASOPHILS NFR BLD: 0 % (ref 0–1)
BILIRUB SERPL-MCNC: 0.5 MG/DL (ref 0.2–1)
BUN SERPL-MCNC: 7 MG/DL (ref 6–20)
BUN/CREAT SERPL: 13 (ref 12–20)
CALCIUM SERPL-MCNC: 8.2 MG/DL (ref 8.5–10.1)
CHLORIDE SERPL-SCNC: 102 MMOL/L (ref 97–108)
CO2 SERPL-SCNC: 26 MMOL/L (ref 21–32)
CREAT SERPL-MCNC: 0.54 MG/DL (ref 0.55–1.02)
DIFFERENTIAL METHOD BLD: ABNORMAL
EOSINOPHIL # BLD: 0 K/UL (ref 0–0.4)
EOSINOPHIL NFR BLD: 0 % (ref 0–7)
ERYTHROCYTE [DISTWIDTH] IN BLOOD BY AUTOMATED COUNT: 12.8 % (ref 11.5–14.5)
GLOBULIN SER CALC-MCNC: 3.3 G/DL (ref 2–4)
GLUCOSE BLD STRIP.AUTO-MCNC: 172 MG/DL (ref 65–117)
GLUCOSE BLD STRIP.AUTO-MCNC: 187 MG/DL (ref 65–117)
GLUCOSE BLD STRIP.AUTO-MCNC: 210 MG/DL (ref 65–117)
GLUCOSE BLD STRIP.AUTO-MCNC: 250 MG/DL (ref 65–117)
GLUCOSE SERPL-MCNC: 247 MG/DL (ref 65–100)
HCT VFR BLD AUTO: 21 % (ref 35–47)
HCT VFR BLD AUTO: 21 % (ref 35–47)
HGB BLD-MCNC: 7.2 G/DL (ref 11.5–16)
HGB BLD-MCNC: 7.5 G/DL (ref 11.5–16)
IMM GRANULOCYTES # BLD AUTO: 0.1 K/UL (ref 0–0.04)
IMM GRANULOCYTES NFR BLD AUTO: 1 % (ref 0–0.5)
LYMPHOCYTES # BLD: 0.6 K/UL (ref 0.8–3.5)
LYMPHOCYTES NFR BLD: 5 % (ref 12–49)
MCH RBC QN AUTO: 29.4 PG (ref 26–34)
MCHC RBC AUTO-ENTMCNC: 34.3 G/DL (ref 30–36.5)
MCV RBC AUTO: 85.7 FL (ref 80–99)
MONOCYTES # BLD: 0.5 K/UL (ref 0–1)
MONOCYTES NFR BLD: 4 % (ref 5–13)
NEUTS SEG # BLD: 11.7 K/UL (ref 1.8–8)
NEUTS SEG NFR BLD: 90 % (ref 32–75)
NRBC # BLD: 0 K/UL (ref 0–0.01)
NRBC BLD-RTO: 0 PER 100 WBC
PLATELET # BLD AUTO: 179 K/UL (ref 150–400)
PMV BLD AUTO: 10.4 FL (ref 8.9–12.9)
POTASSIUM SERPL-SCNC: 3.9 MMOL/L (ref 3.5–5.1)
PROT SERPL-MCNC: 6.2 G/DL (ref 6.4–8.2)
RBC # BLD AUTO: 2.45 M/UL (ref 3.8–5.2)
RBC MORPH BLD: ABNORMAL
SERVICE CMNT-IMP: ABNORMAL
SODIUM SERPL-SCNC: 132 MMOL/L (ref 136–145)
WBC # BLD AUTO: 12.9 K/UL (ref 3.6–11)

## 2023-11-02 PROCEDURE — 82962 GLUCOSE BLOOD TEST: CPT

## 2023-11-02 PROCEDURE — 99231 SBSQ HOSP IP/OBS SF/LOW 25: CPT

## 2023-11-02 PROCEDURE — 6370000000 HC RX 637 (ALT 250 FOR IP): Performed by: ORTHOPAEDIC SURGERY

## 2023-11-02 PROCEDURE — 85014 HEMATOCRIT: CPT

## 2023-11-02 PROCEDURE — 6360000002 HC RX W HCPCS: Performed by: ORTHOPAEDIC SURGERY

## 2023-11-02 PROCEDURE — 97530 THERAPEUTIC ACTIVITIES: CPT

## 2023-11-02 PROCEDURE — 2580000003 HC RX 258: Performed by: ORTHOPAEDIC SURGERY

## 2023-11-02 PROCEDURE — 97116 GAIT TRAINING THERAPY: CPT

## 2023-11-02 PROCEDURE — 1100000003 HC PRIVATE W/ TELEMETRY

## 2023-11-02 PROCEDURE — 97161 PT EVAL LOW COMPLEX 20 MIN: CPT

## 2023-11-02 PROCEDURE — 85018 HEMOGLOBIN: CPT

## 2023-11-02 PROCEDURE — 36415 COLL VENOUS BLD VENIPUNCTURE: CPT

## 2023-11-02 PROCEDURE — 85025 COMPLETE CBC W/AUTO DIFF WBC: CPT

## 2023-11-02 PROCEDURE — 80053 COMPREHEN METABOLIC PANEL: CPT

## 2023-11-02 RX ADMIN — Medication: at 08:17

## 2023-11-02 RX ADMIN — PIPERACILLIN AND TAZOBACTAM 3375 MG: 3; .375 INJECTION, POWDER, LYOPHILIZED, FOR SOLUTION INTRAVENOUS at 23:02

## 2023-11-02 RX ADMIN — GABAPENTIN 100 MG: 100 CAPSULE ORAL at 13:41

## 2023-11-02 RX ADMIN — SODIUM CHLORIDE, PRESERVATIVE FREE 10 ML: 5 INJECTION INTRAVENOUS at 08:07

## 2023-11-02 RX ADMIN — OXYCODONE 10 MG: 5 TABLET ORAL at 23:01

## 2023-11-02 RX ADMIN — Medication: at 21:20

## 2023-11-02 RX ADMIN — DULOXETINE HYDROCHLORIDE 60 MG: 30 CAPSULE, DELAYED RELEASE ORAL at 08:02

## 2023-11-02 RX ADMIN — TRAZODONE HYDROCHLORIDE 100 MG: 100 TABLET ORAL at 22:47

## 2023-11-02 RX ADMIN — ATORVASTATIN CALCIUM 20 MG: 20 TABLET, FILM COATED ORAL at 22:47

## 2023-11-02 RX ADMIN — ENOXAPARIN SODIUM 40 MG: 100 INJECTION SUBCUTANEOUS at 08:03

## 2023-11-02 RX ADMIN — OXYCODONE 10 MG: 5 TABLET ORAL at 08:02

## 2023-11-02 RX ADMIN — Medication 100 MG: at 08:02

## 2023-11-02 RX ADMIN — PIPERACILLIN AND TAZOBACTAM 3375 MG: 3; .375 INJECTION, POWDER, LYOPHILIZED, FOR SOLUTION INTRAVENOUS at 13:41

## 2023-11-02 RX ADMIN — GABAPENTIN 100 MG: 100 CAPSULE ORAL at 22:47

## 2023-11-02 RX ADMIN — PIPERACILLIN AND TAZOBACTAM 3375 MG: 3; .375 INJECTION, POWDER, LYOPHILIZED, FOR SOLUTION INTRAVENOUS at 06:30

## 2023-11-02 RX ADMIN — GABAPENTIN 100 MG: 100 CAPSULE ORAL at 08:03

## 2023-11-02 RX ADMIN — OXYCODONE 10 MG: 5 TABLET ORAL at 13:41

## 2023-11-02 ASSESSMENT — PAIN DESCRIPTION - LOCATION
LOCATION: LEG

## 2023-11-02 ASSESSMENT — PAIN SCALES - GENERAL
PAINLEVEL_OUTOF10: 5
PAINLEVEL_OUTOF10: 8
PAINLEVEL_OUTOF10: 7
PAINLEVEL_OUTOF10: 6
PAINLEVEL_OUTOF10: 8

## 2023-11-02 ASSESSMENT — PAIN DESCRIPTION - DESCRIPTORS
DESCRIPTORS: ACHING;BURNING
DESCRIPTORS: ACHING;BURNING
DESCRIPTORS: THROBBING
DESCRIPTORS: THROBBING

## 2023-11-02 ASSESSMENT — PAIN DESCRIPTION - ORIENTATION
ORIENTATION: LEFT

## 2023-11-02 NOTE — ANESTHESIA POSTPROCEDURE EVALUATION
Department of Anesthesiology  Postprocedure Note    Patient: Michelle Musa  MRN: [id number]  YOB: 1960  Date of evaluation: 11/1/2023      Procedure Summary     Date: 11/01/23 Room / Location: [facility] MA[facility] OR M8 / [facility] MA[facility] OR    Anesthesia Start: 3696 Anesthesia Stop: 8710    Procedure: LEFT FEMUR IM NAIL ADRIANNE INSERTION ANTEGRADE (Left: Leg Upper) Diagnosis:       Closed fracture of left hip, initial encounter ([address])      (Closed fracture of left hip, initial encounter ([address]) Miguel Euceda)    Providers: Jim Chance MD Responsible Provider: Samantha Bingham MD    Anesthesia Type: General, TIVA ASA Status: 3          Anesthesia Type: General, TIVA    Ady Phase I: Ady Score: 10    Ady Phase II:        Anesthesia Post Evaluation    Patient location during evaluation: bedside  Patient participation: complete - patient participated  Level of consciousness: awake and alert  Airway patency: patent  Nausea & Vomiting: no nausea and no vomiting  Complications: no  Cardiovascular status: hemodynamically stable  Respiratory status: acceptable  Hydration status: stable  Multimodal analgesia pain management approach  Pain management: adequate

## 2023-11-02 NOTE — OP NOTE
OPERATIVE REPORT    FACILITY: [facility]    PATIENT NAME: Arlene Morales     DATE OF OPERATION: 11/1/23    PREOPERATIVE DIAGNOSIS: left grade 2 open supracondylar distal femur fracture with intracondylar extension    POSTOPERATIVE DIAGNOSIS: same    SURGERIES PERFORMED:   Open reduction with internal fixation left distal femoral shaft fracture with retrograde nail  Irrigation and debridement skin, soft tissue, muscle, and bone for open fracture 4 x 6 cm    ATTENDING PHYSICIAN: Romaine Bernstein MD    ASSISTANT: Jasmeet Valerio    IMPLANTS:  [place] T2 alpha 13 x 400 mm retrograde nail    SPECIMENS:  none    OPERATIVE FINDINGS: Open fracture without contamination. Severe comminution. Stable fixation upon conclusion. ANESTHESIA: general    FLUIDS: Please see anesthesia record    ESTIMATED BLOOD LOSS: 200 cc     TOURNIQUET TIME: n/a    INDICATIONS FOR PROCEDURE: This patient is a 58 y.o. female who presented to our institution with grade 2 open left distal femur fracture after ground level fall. Antibiotics and tetanus were given in ER. . After a detailed discussion regarding the risks, benefits, and alternatives to surgery, informed consent was obtained. The patient presents now to the operating room for that operative procedure. DETAILED DESCRIPTION OF PROCEDURE: The patient was identified in preoperative holding. The operative extremity was marked. The patient was then taken back to the operating room where the anesthetic of choice was administered. The patient was positioned on the operating room table taking care to pad all bony prominences. The operative site was prepped and draped in the usual sterile fashion. Appropriate antibiotics were administered and timeouts were performed in keeping with guidelines. The open fracture component was addressed first. The open wound was extended distally and proximally to allow full debridement. There was no significant contamination.  The area measuring 4 x 6 cm was roughly

## 2023-11-03 ENCOUNTER — HOME HEALTH ADMISSION (OUTPATIENT)
Dept: HOME HEALTH SERVICES | Facility: HOME HEALTH | Age: 63
End: 2023-11-03
Payer: COMMERCIAL

## 2023-11-03 PROBLEM — G89.18 ACUTE POST-OPERATIVE PAIN: Status: ACTIVE | Noted: 2023-11-03

## 2023-11-03 PROBLEM — E10.8 TYPE 1 DIABETES MELLITUS WITH COMPLICATION, WITH LONG TERM CURRENT USE OF INSULIN PUMP (HCC): Status: ACTIVE | Noted: 2023-11-03

## 2023-11-03 PROBLEM — Z96.41 TYPE 1 DIABETES MELLITUS WITH COMPLICATION, WITH LONG TERM CURRENT USE OF INSULIN PUMP (HCC): Status: ACTIVE | Noted: 2023-11-03

## 2023-11-03 PROBLEM — E10.65 TYPE 1 DIABETES MELLITUS WITH HYPERGLYCEMIA (HCC): Status: ACTIVE | Noted: 2023-11-03

## 2023-11-03 LAB
ANION GAP SERPL CALC-SCNC: 5 MMOL/L (ref 5–15)
BUN SERPL-MCNC: 9 MG/DL (ref 6–20)
BUN/CREAT SERPL: 14 (ref 12–20)
CALCIUM SERPL-MCNC: 8.1 MG/DL (ref 8.5–10.1)
CHLORIDE SERPL-SCNC: 102 MMOL/L (ref 97–108)
CO2 SERPL-SCNC: 24 MMOL/L (ref 21–32)
CREAT SERPL-MCNC: 0.66 MG/DL (ref 0.55–1.02)
GLUCOSE BLD STRIP.AUTO-MCNC: 168 MG/DL (ref 65–117)
GLUCOSE BLD STRIP.AUTO-MCNC: 243 MG/DL (ref 65–117)
GLUCOSE BLD STRIP.AUTO-MCNC: 308 MG/DL (ref 65–117)
GLUCOSE BLD STRIP.AUTO-MCNC: 355 MG/DL (ref 65–117)
GLUCOSE BLD STRIP.AUTO-MCNC: 363 MG/DL (ref 65–117)
GLUCOSE BLD STRIP.AUTO-MCNC: 374 MG/DL (ref 65–117)
GLUCOSE SERPL-MCNC: 321 MG/DL (ref 65–100)
HCT VFR BLD AUTO: 16.3 % (ref 35–47)
HCT VFR BLD AUTO: 24.1 % (ref 35–47)
HGB BLD-MCNC: 5.7 G/DL (ref 11.5–16)
HGB BLD-MCNC: 8.3 G/DL (ref 11.5–16)
HISTORY CHECK: NORMAL
POTASSIUM SERPL-SCNC: 3.6 MMOL/L (ref 3.5–5.1)
SERVICE CMNT-IMP: ABNORMAL
SODIUM SERPL-SCNC: 131 MMOL/L (ref 136–145)

## 2023-11-03 PROCEDURE — 6370000000 HC RX 637 (ALT 250 FOR IP): Performed by: ORTHOPAEDIC SURGERY

## 2023-11-03 PROCEDURE — P9016 RBC LEUKOCYTES REDUCED: HCPCS

## 2023-11-03 PROCEDURE — 80048 BASIC METABOLIC PNL TOTAL CA: CPT

## 2023-11-03 PROCEDURE — 2580000003 HC RX 258: Performed by: ORTHOPAEDIC SURGERY

## 2023-11-03 PROCEDURE — 36415 COLL VENOUS BLD VENIPUNCTURE: CPT

## 2023-11-03 PROCEDURE — 86850 RBC ANTIBODY SCREEN: CPT

## 2023-11-03 PROCEDURE — 1100000003 HC PRIVATE W/ TELEMETRY

## 2023-11-03 PROCEDURE — 85014 HEMATOCRIT: CPT

## 2023-11-03 PROCEDURE — APPNB15 APP NON BILLABLE TIME 0-15 MINS: Performed by: ORTHOPAEDIC SURGERY

## 2023-11-03 PROCEDURE — 85018 HEMOGLOBIN: CPT

## 2023-11-03 PROCEDURE — 99231 SBSQ HOSP IP/OBS SF/LOW 25: CPT

## 2023-11-03 PROCEDURE — 36430 TRANSFUSION BLD/BLD COMPNT: CPT

## 2023-11-03 PROCEDURE — 82962 GLUCOSE BLOOD TEST: CPT

## 2023-11-03 PROCEDURE — 30233N1 TRANSFUSION OF NONAUTOLOGOUS RED BLOOD CELLS INTO PERIPHERAL VEIN, PERCUTANEOUS APPROACH: ICD-10-PCS | Performed by: INTERNAL MEDICINE

## 2023-11-03 PROCEDURE — 86923 COMPATIBILITY TEST ELECTRIC: CPT

## 2023-11-03 PROCEDURE — 86900 BLOOD TYPING SEROLOGIC ABO: CPT

## 2023-11-03 PROCEDURE — 6360000002 HC RX W HCPCS: Performed by: ORTHOPAEDIC SURGERY

## 2023-11-03 PROCEDURE — 86901 BLOOD TYPING SEROLOGIC RH(D): CPT

## 2023-11-03 RX ORDER — SODIUM CHLORIDE 9 MG/ML
INJECTION, SOLUTION INTRAVENOUS PRN
Status: DISCONTINUED | OUTPATIENT
Start: 2023-11-03 | End: 2023-11-04

## 2023-11-03 RX ADMIN — POLYETHYLENE GLYCOL 3350 17 G: 17 POWDER, FOR SOLUTION ORAL at 08:56

## 2023-11-03 RX ADMIN — Medication: at 11:47

## 2023-11-03 RX ADMIN — PIPERACILLIN AND TAZOBACTAM 3375 MG: 3; .375 INJECTION, POWDER, LYOPHILIZED, FOR SOLUTION INTRAVENOUS at 21:50

## 2023-11-03 RX ADMIN — Medication: at 21:56

## 2023-11-03 RX ADMIN — TRAZODONE HYDROCHLORIDE 100 MG: 100 TABLET ORAL at 21:49

## 2023-11-03 RX ADMIN — PIPERACILLIN AND TAZOBACTAM 3375 MG: 3; .375 INJECTION, POWDER, LYOPHILIZED, FOR SOLUTION INTRAVENOUS at 15:02

## 2023-11-03 RX ADMIN — GABAPENTIN 100 MG: 100 CAPSULE ORAL at 08:56

## 2023-11-03 RX ADMIN — GABAPENTIN 100 MG: 100 CAPSULE ORAL at 21:49

## 2023-11-03 RX ADMIN — PIPERACILLIN AND TAZOBACTAM 3375 MG: 3; .375 INJECTION, POWDER, LYOPHILIZED, FOR SOLUTION INTRAVENOUS at 06:08

## 2023-11-03 RX ADMIN — Medication 100 MG: at 08:56

## 2023-11-03 RX ADMIN — DULOXETINE HYDROCHLORIDE 60 MG: 30 CAPSULE, DELAYED RELEASE ORAL at 08:56

## 2023-11-03 RX ADMIN — ATORVASTATIN CALCIUM 20 MG: 20 TABLET, FILM COATED ORAL at 21:49

## 2023-11-03 RX ADMIN — OXYCODONE 10 MG: 5 TABLET ORAL at 08:56

## 2023-11-03 RX ADMIN — SODIUM CHLORIDE, PRESERVATIVE FREE 10 ML: 5 INJECTION INTRAVENOUS at 09:00

## 2023-11-03 RX ADMIN — GABAPENTIN 100 MG: 100 CAPSULE ORAL at 15:02

## 2023-11-03 RX ADMIN — VALSARTAN 160 MG: 160 TABLET, FILM COATED ORAL at 08:56

## 2023-11-03 ASSESSMENT — PAIN SCALES - GENERAL
PAINLEVEL_OUTOF10: 0
PAINLEVEL_OUTOF10: 7
PAINLEVEL_OUTOF10: 0

## 2023-11-03 ASSESSMENT — PAIN DESCRIPTION - PAIN TYPE: TYPE: ACUTE PAIN

## 2023-11-03 ASSESSMENT — PAIN DESCRIPTION - DESCRIPTORS: DESCRIPTORS: ACHING;BURNING

## 2023-11-03 ASSESSMENT — PAIN DESCRIPTION - FREQUENCY: FREQUENCY: INTERMITTENT

## 2023-11-03 ASSESSMENT — PAIN DESCRIPTION - ONSET: ONSET: GRADUAL

## 2023-11-03 ASSESSMENT — PAIN DESCRIPTION - LOCATION: LOCATION: LEG

## 2023-11-03 ASSESSMENT — PAIN DESCRIPTION - ORIENTATION: ORIENTATION: LEFT

## 2023-11-03 NOTE — DIABETES MGMT
[facility]  DIABETES MANAGEMENT CONSULT    Consulted by Provider for advanced nursing evaluation and care for inpatient blood glucose management. Evaluation and Action Plan   This 58year old female patient with Type 1 diabetes reports good BG control prior to admission. The patient uses a Medtronic insulin pump. Her diabetes is managed by her endocrinologist in [place]. The patient plans to return to [place]. She  wishes to use her insulin pump while hospitalized. The patient is alert and oriented and able to operate her own pump. She changed her set yesterday and has additional supplies on hand if needed. The patient is using a Freestyle Mir 2 CGM. She uses approx 24 units total daily basal insulin. She uses 1:7 carb ratio. If for any reason the patient is unable to operate her own insulin infusion please disconnect and start on basl insulin injection. The patient reports feeling well today. The patient reports some pain but reports it as mostly controlled. She is eating well. Her insulin pump is working as well as her CGM. The patient is alert and oriented and able to operate her insulin pump. BG's are are elevated this morning, likely due to the dexamethasone dose she was administer last evening. BG's should start to stabilize this evening. BG's elevated today. The patient reports that she was running her basal at a lower rate last evening. PTA settings now resumed. The patient also did a complete set change for Medtronic insulin pump this morning. The patient is alert and oriented. She reports that she is tired today due to not sleeping. The patient reports that her surgical wound site was painful as well. I suspect this played a role in BG elevation also. The site was reportedly bleeding and dressing change and also has a drain placed. She reports that she is anticipated to have RBC's transfused ( hemoglobin 5.7).      Management Rationale Action Plan   Medication
[facility]  DIABETES MANAGEMENT CONSULT    Consulted by Provider for advanced nursing evaluation and care for inpatient blood glucose management. Evaluation and Action Plan   This 58year old female patient with Type 1 diabetes reports good BG control prior to admission. The patient uses a Medtronic insulin pump. Her diabetes is managed by her endocrinologist in [place]. The patient plans to return to [place]. She  wishes to use her insulin pump while hospitalized. The patient is alert and oriented and able to operate her own pump. She changed her set yesterday and has additional supplies on hand if needed. The patient is using a Freestyle Mir 2 CGM. She uses approx 24 units total daily basal insulin. She uses 1:7 carb ratio. If for any reason the patient is unable to operate her own insulin infusion please disconnect and start on basl insulin injection. The patient reports feeling well today. The patient reports some pain but reports it as mostly controlled. She is eating well. Her insulin pump is working as well as her CGM. The patient is alert and oriented and able to operate her insulin pump. BG's are are elevated this morning, likely due to the dexamethasone dose she was administer last evening. BG's should start to stabilize this evening. Management Rationale Action Plan   Medication   Basal needs Using Medtronic insulin pump Using approx 24 units basal insulin daily   Nutritional needs Boluses for meals Use 1 unit insuli for each 7 gm carbs       Diabetes Discharge Plan   Medication  I. Continue Medtronic insulin pump at current settings and follow-up with endocrinologist for future diabetes management   Referral  []        Outpatient diabetes education   Additional orders            Initial Presentation   Susan Hutchinson is a 58 y.o. female admitted  after experiencing a fracture of the left leg after tripping and falling on a patio. LAB: glucose 178.
3.5   CL 98 101   CO2 26 25   BUN 7 8   CREATININE 0.58 0.47*     Lab Results   Component Value Date    ALT 22 11/01/2023    AST 13 (L) 11/01/2023    ALKPHOS 52 11/01/2023    BILITOT 0.5 11/01/2023     No results found for: \"TSH\", \"TSHREFLEX\", \"TSHFT4\", \"TSHELE\", \"MRR8HDW\", \"TSHHS\"  No results found for: \"LABA1C\"    Factors impacting BG management  Factor Dose Comments   Nutrition:  Standard meals     60 grams/meal     NPO today fir surgery   Pain     Other:   Kidney function  Liver function     Normal   Normal      Blood glucose pattern        Assessment and Nursing Intervention   Nursing Diagnosis Risk for unstable blood glucose pattern   Nursing Intervention Domain 5250 Decision-making Support   Nursing Interventions Examined current inpatient diabetes/blood glucose control   Explored factors facilitating and impeding inpatient management  Explored corrective strategies with patient and responsible inpatient provider   Informed patient of rational for insulin strategy while hospitalized     Nursing Diagnosis 60976 Ineffective Health Management   Nursing Intervention Domain 5250 Decision-making Support   Nursing Interventions Identified diabetes self-management practices impeding diabetes control  Discussed diabetes survival skills related to  Healthy Plate eating plan; given handouts  Role of physical activity in improving insulin sensitivity and action  Procedure for blood glucose monitoring & options for low-cost products  Medications plan at discharge     Billing Code(s)   [] 28769 IP subsequent hospital care - 50 minutes   [] 03383 IP subsequent hospital care - 35 minutes   [] [phone] IP subsequent hospital care - 25 minutes   [x] 55549 IP initial hospital care - 40 minutes     Before making these care recommendations, I personally reviewed the hospitalization record, including notes, laboratory & diagnostic data and current medications, and examined the patient at the bedside (circumstances permitting) before

## 2023-11-03 NOTE — CARE COORDINATION
Transition of Care Plan:    RUR: 12%  Prior Level of Functioning: independent   Disposition: sister's home with home health   Follow up appointments: ortho  DME needed: owns dme needed for d/c  Transportation at discharge: anticipate BLS due to pt not being able to ambulate up stairs   Caregiver Contact: sister Anisha [phone]  Discharge Caregiver contacted prior to discharge? To be contacted upon pt request  Care Conference needed? Not at this time   Barriers to discharge: medical stability    Update  [facility] accepted. Goal for d/c tomorrow pending medical stability. Will likely need medical transport as pt will not be able to ambulate up stairs. Initial note  CM made room visit with patient to discuss d/c planning. Therapy recommending [facility] services. Pt agreeable and confirmed she will be staying with her sister (address: [address] [place] [address]). Pt voiced no preference in [facility] agency. Referral sent to [facility] and pending. Pt confirmed her sister has a RW and w/c at home that she can use.      [facility]0 [address]

## 2023-11-04 LAB
ABO + RH BLD: NORMAL
ANION GAP SERPL CALC-SCNC: 5 MMOL/L (ref 5–15)
BASOPHILS # BLD: 0 K/UL (ref 0–0.1)
BASOPHILS NFR BLD: 0 % (ref 0–1)
BLD PROD TYP BPU: NORMAL
BLOOD BANK DISPENSE STATUS: NORMAL
BLOOD GROUP ANTIBODIES SERPL: NORMAL
BPU ID: NORMAL
BUN SERPL-MCNC: 5 MG/DL (ref 6–20)
BUN/CREAT SERPL: 10 (ref 12–20)
CALCIUM SERPL-MCNC: 8.4 MG/DL (ref 8.5–10.1)
CHLORIDE SERPL-SCNC: 106 MMOL/L (ref 97–108)
CO2 SERPL-SCNC: 25 MMOL/L (ref 21–32)
CREAT SERPL-MCNC: 0.48 MG/DL (ref 0.55–1.02)
CROSSMATCH RESULT: NORMAL
DIFFERENTIAL METHOD BLD: ABNORMAL
EOSINOPHIL # BLD: 0.1 K/UL (ref 0–0.4)
EOSINOPHIL NFR BLD: 1 % (ref 0–7)
ERYTHROCYTE [DISTWIDTH] IN BLOOD BY AUTOMATED COUNT: 13.2 % (ref 11.5–14.5)
GLUCOSE BLD STRIP.AUTO-MCNC: 188 MG/DL (ref 65–117)
GLUCOSE BLD STRIP.AUTO-MCNC: 233 MG/DL (ref 65–117)
GLUCOSE BLD STRIP.AUTO-MCNC: 244 MG/DL (ref 65–117)
GLUCOSE BLD STRIP.AUTO-MCNC: 266 MG/DL (ref 65–117)
GLUCOSE SERPL-MCNC: 214 MG/DL (ref 65–100)
HCT VFR BLD AUTO: 24.3 % (ref 35–47)
HGB BLD-MCNC: 8.5 G/DL (ref 11.5–16)
IMM GRANULOCYTES # BLD AUTO: 0.1 K/UL (ref 0–0.04)
IMM GRANULOCYTES NFR BLD AUTO: 1 % (ref 0–0.5)
LYMPHOCYTES # BLD: 1.4 K/UL (ref 0.8–3.5)
LYMPHOCYTES NFR BLD: 16 % (ref 12–49)
MCH RBC QN AUTO: 29.9 PG (ref 26–34)
MCHC RBC AUTO-ENTMCNC: 35 G/DL (ref 30–36.5)
MCV RBC AUTO: 85.6 FL (ref 80–99)
MONOCYTES # BLD: 0.7 K/UL (ref 0–1)
MONOCYTES NFR BLD: 8 % (ref 5–13)
NEUTS SEG # BLD: 6.6 K/UL (ref 1.8–8)
NEUTS SEG NFR BLD: 74 % (ref 32–75)
NRBC # BLD: 0.02 K/UL (ref 0–0.01)
NRBC BLD-RTO: 0.2 PER 100 WBC
PLATELET # BLD AUTO: 170 K/UL (ref 150–400)
PMV BLD AUTO: 9.8 FL (ref 8.9–12.9)
POTASSIUM SERPL-SCNC: 3.4 MMOL/L (ref 3.5–5.1)
RBC # BLD AUTO: 2.84 M/UL (ref 3.8–5.2)
SERVICE CMNT-IMP: ABNORMAL
SODIUM SERPL-SCNC: 136 MMOL/L (ref 136–145)
SPECIMEN EXP DATE BLD: NORMAL
UNIT DIVISION: 0
WBC # BLD AUTO: 8.8 K/UL (ref 3.6–11)

## 2023-11-04 PROCEDURE — 97530 THERAPEUTIC ACTIVITIES: CPT

## 2023-11-04 PROCEDURE — 6370000000 HC RX 637 (ALT 250 FOR IP): Performed by: ORTHOPAEDIC SURGERY

## 2023-11-04 PROCEDURE — 1100000003 HC PRIVATE W/ TELEMETRY

## 2023-11-04 PROCEDURE — 6370000000 HC RX 637 (ALT 250 FOR IP): Performed by: INTERNAL MEDICINE

## 2023-11-04 PROCEDURE — 80048 BASIC METABOLIC PNL TOTAL CA: CPT

## 2023-11-04 PROCEDURE — 82962 GLUCOSE BLOOD TEST: CPT

## 2023-11-04 PROCEDURE — 6360000002 HC RX W HCPCS: Performed by: ORTHOPAEDIC SURGERY

## 2023-11-04 PROCEDURE — 36415 COLL VENOUS BLD VENIPUNCTURE: CPT

## 2023-11-04 PROCEDURE — 6370000000 HC RX 637 (ALT 250 FOR IP): Performed by: PHYSICIAN ASSISTANT

## 2023-11-04 PROCEDURE — 2580000003 HC RX 258: Performed by: ORTHOPAEDIC SURGERY

## 2023-11-04 PROCEDURE — 85025 COMPLETE CBC W/AUTO DIFF WBC: CPT

## 2023-11-04 PROCEDURE — 97116 GAIT TRAINING THERAPY: CPT

## 2023-11-04 RX ORDER — POTASSIUM CHLORIDE 750 MG/1
40 TABLET, FILM COATED, EXTENDED RELEASE ORAL ONCE
Status: COMPLETED | OUTPATIENT
Start: 2023-11-04 | End: 2023-11-04

## 2023-11-04 RX ORDER — ASPIRIN 81 MG/1
81 TABLET, CHEWABLE ORAL 2 TIMES DAILY WITH MEALS
Status: DISCONTINUED | OUTPATIENT
Start: 2023-11-04 | End: 2023-11-05 | Stop reason: HOSPADM

## 2023-11-04 RX ADMIN — PIPERACILLIN AND TAZOBACTAM 3375 MG: 3; .375 INJECTION, POWDER, LYOPHILIZED, FOR SOLUTION INTRAVENOUS at 06:10

## 2023-11-04 RX ADMIN — PIPERACILLIN AND TAZOBACTAM 3375 MG: 3; .375 INJECTION, POWDER, LYOPHILIZED, FOR SOLUTION INTRAVENOUS at 14:08

## 2023-11-04 RX ADMIN — SODIUM CHLORIDE, PRESERVATIVE FREE 10 ML: 5 INJECTION INTRAVENOUS at 22:25

## 2023-11-04 RX ADMIN — GABAPENTIN 100 MG: 100 CAPSULE ORAL at 08:47

## 2023-11-04 RX ADMIN — OXYCODONE 10 MG: 5 TABLET ORAL at 08:47

## 2023-11-04 RX ADMIN — Medication 100 MG: at 08:47

## 2023-11-04 RX ADMIN — ACETAMINOPHEN 650 MG: 325 TABLET ORAL at 22:23

## 2023-11-04 RX ADMIN — ASPIRIN 81 MG: 81 TABLET, CHEWABLE ORAL at 16:54

## 2023-11-04 RX ADMIN — POTASSIUM CHLORIDE 40 MEQ: 750 TABLET, FILM COATED, EXTENDED RELEASE ORAL at 08:47

## 2023-11-04 RX ADMIN — GABAPENTIN 100 MG: 100 CAPSULE ORAL at 14:07

## 2023-11-04 RX ADMIN — TRAZODONE HYDROCHLORIDE 100 MG: 100 TABLET ORAL at 22:16

## 2023-11-04 RX ADMIN — Medication: at 08:49

## 2023-11-04 RX ADMIN — VALSARTAN 160 MG: 160 TABLET, FILM COATED ORAL at 08:47

## 2023-11-04 RX ADMIN — PIPERACILLIN AND TAZOBACTAM 3375 MG: 3; .375 INJECTION, POWDER, LYOPHILIZED, FOR SOLUTION INTRAVENOUS at 22:15

## 2023-11-04 RX ADMIN — GABAPENTIN 100 MG: 100 CAPSULE ORAL at 22:16

## 2023-11-04 RX ADMIN — GLYCERIN 2 G: 2 SUPPOSITORY RECTAL at 14:07

## 2023-11-04 RX ADMIN — DULOXETINE HYDROCHLORIDE 60 MG: 30 CAPSULE, DELAYED RELEASE ORAL at 08:47

## 2023-11-04 RX ADMIN — ATORVASTATIN CALCIUM 20 MG: 20 TABLET, FILM COATED ORAL at 22:16

## 2023-11-04 RX ADMIN — Medication: at 22:25

## 2023-11-04 RX ADMIN — POLYETHYLENE GLYCOL 3350 17 G: 17 POWDER, FOR SOLUTION ORAL at 08:46

## 2023-11-04 RX ADMIN — SODIUM CHLORIDE, PRESERVATIVE FREE 10 ML: 5 INJECTION INTRAVENOUS at 08:49

## 2023-11-04 ASSESSMENT — PAIN DESCRIPTION - ORIENTATION
ORIENTATION: LEFT

## 2023-11-04 ASSESSMENT — PAIN SCALES - GENERAL
PAINLEVEL_OUTOF10: 2
PAINLEVEL_OUTOF10: 3
PAINLEVEL_OUTOF10: 0
PAINLEVEL_OUTOF10: 5

## 2023-11-04 ASSESSMENT — PAIN - FUNCTIONAL ASSESSMENT
PAIN_FUNCTIONAL_ASSESSMENT: PREVENTS OR INTERFERES SOME ACTIVE ACTIVITIES AND ADLS
PAIN_FUNCTIONAL_ASSESSMENT: PREVENTS OR INTERFERES SOME ACTIVE ACTIVITIES AND ADLS
PAIN_FUNCTIONAL_ASSESSMENT: ACTIVITIES ARE NOT PREVENTED

## 2023-11-04 ASSESSMENT — PAIN DESCRIPTION - ONSET
ONSET: SUDDEN
ONSET: SUDDEN
ONSET: GRADUAL

## 2023-11-04 ASSESSMENT — PAIN DESCRIPTION - DESCRIPTORS
DESCRIPTORS: SORE
DESCRIPTORS: ACHING
DESCRIPTORS: ACHING

## 2023-11-04 ASSESSMENT — PAIN DESCRIPTION - LOCATION
LOCATION: LEG
LOCATION: KNEE
LOCATION: LEG

## 2023-11-04 ASSESSMENT — PAIN DESCRIPTION - PAIN TYPE
TYPE: ACUTE PAIN
TYPE: SURGICAL PAIN
TYPE: ACUTE PAIN

## 2023-11-04 ASSESSMENT — PAIN DESCRIPTION - FREQUENCY
FREQUENCY: INTERMITTENT

## 2023-11-05 VITALS
DIASTOLIC BLOOD PRESSURE: 69 MMHG | HEART RATE: 91 BPM | OXYGEN SATURATION: 100 % | RESPIRATION RATE: 16 BRPM | SYSTOLIC BLOOD PRESSURE: 114 MMHG | TEMPERATURE: 98.2 F | WEIGHT: 153 LBS | HEIGHT: 66 IN | BODY MASS INDEX: 24.59 KG/M2

## 2023-11-05 LAB
ANION GAP SERPL CALC-SCNC: 3 MMOL/L (ref 5–15)
BASOPHILS # BLD: 0 K/UL (ref 0–0.1)
BASOPHILS NFR BLD: 0 % (ref 0–1)
BUN SERPL-MCNC: 5 MG/DL (ref 6–20)
BUN/CREAT SERPL: 12 (ref 12–20)
CALCIUM SERPL-MCNC: 8.3 MG/DL (ref 8.5–10.1)
CHLORIDE SERPL-SCNC: 105 MMOL/L (ref 97–108)
CO2 SERPL-SCNC: 28 MMOL/L (ref 21–32)
CREAT SERPL-MCNC: 0.42 MG/DL (ref 0.55–1.02)
DIFFERENTIAL METHOD BLD: ABNORMAL
EOSINOPHIL # BLD: 0.2 K/UL (ref 0–0.4)
EOSINOPHIL NFR BLD: 3 % (ref 0–7)
ERYTHROCYTE [DISTWIDTH] IN BLOOD BY AUTOMATED COUNT: 13.3 % (ref 11.5–14.5)
GLUCOSE BLD STRIP.AUTO-MCNC: 225 MG/DL (ref 65–117)
GLUCOSE BLD STRIP.AUTO-MCNC: 262 MG/DL (ref 65–117)
GLUCOSE SERPL-MCNC: 223 MG/DL (ref 65–100)
HCT VFR BLD AUTO: 23.9 % (ref 35–47)
HGB BLD-MCNC: 8.1 G/DL (ref 11.5–16)
IMM GRANULOCYTES # BLD AUTO: 0.1 K/UL (ref 0–0.04)
IMM GRANULOCYTES NFR BLD AUTO: 1 % (ref 0–0.5)
LYMPHOCYTES # BLD: 1.3 K/UL (ref 0.8–3.5)
LYMPHOCYTES NFR BLD: 18 % (ref 12–49)
MCH RBC QN AUTO: 29.3 PG (ref 26–34)
MCHC RBC AUTO-ENTMCNC: 33.9 G/DL (ref 30–36.5)
MCV RBC AUTO: 86.6 FL (ref 80–99)
MONOCYTES # BLD: 0.5 K/UL (ref 0–1)
MONOCYTES NFR BLD: 7 % (ref 5–13)
NEUTS SEG # BLD: 5 K/UL (ref 1.8–8)
NEUTS SEG NFR BLD: 71 % (ref 32–75)
NRBC # BLD: 0 K/UL (ref 0–0.01)
NRBC BLD-RTO: 0 PER 100 WBC
PLATELET # BLD AUTO: 185 K/UL (ref 150–400)
PMV BLD AUTO: 9.5 FL (ref 8.9–12.9)
POTASSIUM SERPL-SCNC: 3.8 MMOL/L (ref 3.5–5.1)
RBC # BLD AUTO: 2.76 M/UL (ref 3.8–5.2)
SERVICE CMNT-IMP: ABNORMAL
SERVICE CMNT-IMP: ABNORMAL
SODIUM SERPL-SCNC: 136 MMOL/L (ref 136–145)
WBC # BLD AUTO: 7 K/UL (ref 3.6–11)

## 2023-11-05 PROCEDURE — 97116 GAIT TRAINING THERAPY: CPT | Performed by: PHYSICAL THERAPIST

## 2023-11-05 PROCEDURE — 6370000000 HC RX 637 (ALT 250 FOR IP): Performed by: ORTHOPAEDIC SURGERY

## 2023-11-05 PROCEDURE — 2580000003 HC RX 258: Performed by: ORTHOPAEDIC SURGERY

## 2023-11-05 PROCEDURE — 85025 COMPLETE CBC W/AUTO DIFF WBC: CPT

## 2023-11-05 PROCEDURE — 80048 BASIC METABOLIC PNL TOTAL CA: CPT

## 2023-11-05 PROCEDURE — 6360000002 HC RX W HCPCS: Performed by: ORTHOPAEDIC SURGERY

## 2023-11-05 PROCEDURE — 36415 COLL VENOUS BLD VENIPUNCTURE: CPT

## 2023-11-05 PROCEDURE — 6370000000 HC RX 637 (ALT 250 FOR IP): Performed by: PHYSICIAN ASSISTANT

## 2023-11-05 PROCEDURE — 82962 GLUCOSE BLOOD TEST: CPT

## 2023-11-05 RX ORDER — FAMOTIDINE 20 MG/1
20 TABLET, FILM COATED ORAL 2 TIMES DAILY
Qty: 60 TABLET | Refills: 3 | Status: SHIPPED | OUTPATIENT
Start: 2023-11-05

## 2023-11-05 RX ORDER — ASPIRIN 81 MG/1
81 TABLET, CHEWABLE ORAL 2 TIMES DAILY WITH MEALS
Qty: 60 TABLET | Refills: 0 | Status: SHIPPED | OUTPATIENT
Start: 2023-11-05 | End: 2023-11-05 | Stop reason: SDUPTHER

## 2023-11-05 RX ORDER — ASPIRIN 81 MG/1
81 TABLET, CHEWABLE ORAL 2 TIMES DAILY WITH MEALS
Qty: 60 TABLET | Refills: 0 | Status: SHIPPED | OUTPATIENT
Start: 2023-11-05 | End: 2023-12-05

## 2023-11-05 RX ORDER — OXYCODONE HYDROCHLORIDE 5 MG/1
5 TABLET ORAL EVERY 6 HOURS PRN
Qty: 12 TABLET | Refills: 0 | Status: SHIPPED | OUTPATIENT
Start: 2023-11-05 | End: 2023-11-08

## 2023-11-05 RX ADMIN — SODIUM CHLORIDE, PRESERVATIVE FREE 10 ML: 5 INJECTION INTRAVENOUS at 09:08

## 2023-11-05 RX ADMIN — ASPIRIN 81 MG: 81 TABLET, CHEWABLE ORAL at 09:09

## 2023-11-05 RX ADMIN — Medication 100 MG: at 09:08

## 2023-11-05 RX ADMIN — GABAPENTIN 100 MG: 100 CAPSULE ORAL at 13:43

## 2023-11-05 RX ADMIN — GABAPENTIN 100 MG: 100 CAPSULE ORAL at 09:09

## 2023-11-05 RX ADMIN — Medication: at 09:11

## 2023-11-05 RX ADMIN — ACETAMINOPHEN 650 MG: 325 TABLET ORAL at 09:16

## 2023-11-05 RX ADMIN — DULOXETINE HYDROCHLORIDE 60 MG: 30 CAPSULE, DELAYED RELEASE ORAL at 09:09

## 2023-11-05 RX ADMIN — PIPERACILLIN AND TAZOBACTAM 3375 MG: 3; .375 INJECTION, POWDER, LYOPHILIZED, FOR SOLUTION INTRAVENOUS at 06:36

## 2023-11-05 RX ADMIN — OXYCODONE 10 MG: 5 TABLET ORAL at 12:17

## 2023-11-05 ASSESSMENT — PAIN DESCRIPTION - ORIENTATION
ORIENTATION: LEFT

## 2023-11-05 ASSESSMENT — PAIN DESCRIPTION - FREQUENCY
FREQUENCY: INTERMITTENT

## 2023-11-05 ASSESSMENT — PAIN SCALES - GENERAL
PAINLEVEL_OUTOF10: 1
PAINLEVEL_OUTOF10: 7
PAINLEVEL_OUTOF10: 2
PAINLEVEL_OUTOF10: 1

## 2023-11-05 ASSESSMENT — PAIN - FUNCTIONAL ASSESSMENT
PAIN_FUNCTIONAL_ASSESSMENT: ACTIVITIES ARE NOT PREVENTED
PAIN_FUNCTIONAL_ASSESSMENT: PREVENTS OR INTERFERES SOME ACTIVE ACTIVITIES AND ADLS
PAIN_FUNCTIONAL_ASSESSMENT: ACTIVITIES ARE NOT PREVENTED
PAIN_FUNCTIONAL_ASSESSMENT: PREVENTS OR INTERFERES SOME ACTIVE ACTIVITIES AND ADLS

## 2023-11-05 ASSESSMENT — PAIN DESCRIPTION - DESCRIPTORS
DESCRIPTORS: ACHING

## 2023-11-05 ASSESSMENT — PAIN DESCRIPTION - LOCATION
LOCATION: LEG
LOCATION: KNEE
LOCATION: LEG
LOCATION: KNEE

## 2023-11-05 ASSESSMENT — PAIN DESCRIPTION - ONSET
ONSET: SUDDEN

## 2023-11-05 ASSESSMENT — PAIN DESCRIPTION - PAIN TYPE
TYPE: SURGICAL PAIN

## 2023-11-05 NOTE — CARE COORDINATION
Transition of Care Plan:    RUR: 14%-\"Low Risk\"  Prior Level of Functioning: Independent in her ADLs and IADLs  Disposition: Home with Home Health Services-[facility] has accepted the patient for services and they are aware the patient will be discharged today, 11/5/23  If SNF or IPR: Date FOC offered: N/A  Follow up appointments: PCP & Specialists as indicated   DME needed: The patient owns all needed DME for d/c   Transportation at discharge: The patient's sister, [place], will be transporting her home  IM/IMM Medicare/ letter given: N/A-patient has Erwin Huggins   Is patient a [place] and connected with [facility]? N/A   If yes, was [place] transfer form completed and [facility] notified? Caregiver Contact: Sister [place], Phone: [phone]  Discharge Caregiver contacted prior to discharge? CM spoke to the patient's sister via phone   Care Conference needed? No  Barriers to discharge: N/A, patient is being discharged today     CM was alerted that the patient is being discharged today, 11/5/23. CM spoke to the patient's sister, [place] (phone: [phone]), and she stated that she feels comfortable with transporting the patient home. The patient also stated that she does not need stretcher transport home and will be able to navigate the few steps into her sister's home. CM contacted [facility] and informed on-call nurse that the patient is being discharged today, 11/5/23. CM ensured that the contact information for [facility] is on the patient's AVS.     2nd IM letter is not needed, from CM perspective the patient can be discharged.      [address] [facility], [facility]

## 2023-11-05 NOTE — DISCHARGE SUMMARY
Discharge Summary    Name: Ricky Martínez  [id number]  YOB: 1960 (Age: 58 y.o.)   Date of Admission: 10/31/2023  Date of Discharge: 11/5/2023  Attending Physician: Davin Teresa MD    Discharge Diagnosis:   Type II compound comminuted distal femur fracture  History of osteopenia  Status post mechanical fall  Post op anemia  Type 1 diabetes mellitus on insulin   History of alcohol use  Active smoker  Mild hyponatremia    Consultations:  IP CONSULT TO DIABETES EDUCATOR  IP CONSULT TO SOCIAL WORK  IP CONSULT TO CASE MANAGEMENT      Brief Admission History/Reason for Admission Per Chidi Pereira MD:   Ricky Martínez is a 58 y.o.  female with PMHx significant diabetes insulin-dependent, active smoker , possible alcohol abuse-brought to the ED today for complaints of pain in her left knee post a fall. Per her relative who was available for the interview-patient was found to be shaky in the morning when she was trying to walk in the pavement around her house, patient had a fall and landed on her left knee. This happened around 11 AM this morning. Patient was wearing full code at that moment, and noticed a wound with the closest removed in the area. She had noticed bleeding from the area. However the area where the wound was present found to be very contaminated by the family member. Patient started noticing pain in the same area, and was not able to bear weight on the left knee post fall. Patient also noticed bone sticking out from the wound. Patient did not sustain any injury to the other part of the body like head neck or back. Patient is a known case of diabetes uses insulin pump at home monitoring system for blood glucose monitoring. Patient is also an active smoker and drinks alcohol. Review of systems : Negative for chest pain, no palpitations, no shortness of breath, no fever, no abdominal pain, no diarrhea. No headache, no neck pain, no back pain.

## 2023-11-07 ENCOUNTER — HOME CARE VISIT (OUTPATIENT)
Facility: HOME HEALTH | Age: 63
End: 2023-11-07
Payer: COMMERCIAL

## 2023-11-07 ENCOUNTER — HOME CARE VISIT (OUTPATIENT)
Dept: HOME HEALTH SERVICES | Facility: HOME HEALTH | Age: 63
End: 2023-11-07
Payer: COMMERCIAL

## 2023-11-07 VITALS
RESPIRATION RATE: 18 BRPM | OXYGEN SATURATION: 95 % | HEART RATE: 77 BPM | SYSTOLIC BLOOD PRESSURE: 110 MMHG | DIASTOLIC BLOOD PRESSURE: 60 MMHG | TEMPERATURE: 98.7 F

## 2023-11-07 VITALS
TEMPERATURE: 98.7 F | DIASTOLIC BLOOD PRESSURE: 60 MMHG | HEART RATE: 77 BPM | SYSTOLIC BLOOD PRESSURE: 110 MMHG | RESPIRATION RATE: 18 BRPM | OXYGEN SATURATION: 95 %

## 2023-11-07 PROCEDURE — G0299 HHS/HOSPICE OF RN EA 15 MIN: HCPCS

## 2023-11-07 PROCEDURE — G0151 HHCP-SERV OF PT,EA 15 MIN: HCPCS

## 2023-11-07 ASSESSMENT — ENCOUNTER SYMPTOMS
PAIN LOCATION - PAIN QUALITY: SORE TO SHARP
DYSPNEA ACTIVITY LEVEL: AFTER AMBULATING 10 - 20 FT
PAIN LOCATION - PAIN QUALITY: ACHY

## 2023-11-07 NOTE — HOME HEALTH
Reason for referral, [facility] SUMMARY of clinical condition: s.p fall resulting in Type II compound comminuted distal left femur fractureTTWB, history of osteopenia, Post op anemia, Type 1 diabetes mellitus on insulin , History of alcohol use, Active smoker. Patient  admitted to home care for PT. Physical Therapy needed for le strength training, fall prevention, pain mgt, transfer training, gait training. Clinical Assessment/Skilled reason for admission to home health (What this means for the patient overall and need for ongoing skilled care):Patient lives in [place]. she is temporarily staying wtih her sister in 1 level home with 3 entrance steps. PT instructed patient in importance of adhering to TTWB and wearing knee immobilizer at all times. she will benefit from home PT to address right le strength training, transfer training,f all prevention gait training and pain mgt    Diagnosis: : s.p fall resulting in Type II compound comminuted distal left femur fractureTTWB, history of osteopenia, Post op anemia, Type 1 diabetes mellitus on insulin , History of alcohol use, Active smoker    Subjective (statement from pt/cg that is relative to why you are there): I want to go home after I see the doctor. Caregiver: sister  Caregiver assists with: Medications, Meals, Bathing, ADL, IADL, Transportation and Housekeeping Caregiver unable to assist with: na. Caregiver is available 24 hours/day Caregiver is  present at this visit and did participate with clinician. Medications reconciled and all medications are available in the home this visit. The following education was provided regarding medications: medication interactions and look alike medications: 100. Patient/CG able to demonstrate knowledge through teach back with 100 percent accuracy. no notified of any discrepancies/medication interactions noted. A list of reconciled medications has been given to the patient/caregiver  and uploaded to media.     High risk

## 2023-11-09 ENCOUNTER — HOME CARE VISIT (OUTPATIENT)
Facility: HOME HEALTH | Age: 63
End: 2023-11-09
Payer: COMMERCIAL

## 2023-11-09 VITALS
RESPIRATION RATE: 17 BRPM | OXYGEN SATURATION: 99 % | DIASTOLIC BLOOD PRESSURE: 63 MMHG | TEMPERATURE: 98.1 F | SYSTOLIC BLOOD PRESSURE: 112 MMHG | HEART RATE: 76 BPM

## 2023-11-09 PROCEDURE — G0152 HHCP-SERV OF OT,EA 15 MIN: HCPCS

## 2023-11-09 ASSESSMENT — ENCOUNTER SYMPTOMS: PAIN LOCATION - PAIN QUALITY: SHARP

## 2023-11-10 ENCOUNTER — HOME CARE VISIT (OUTPATIENT)
Facility: HOME HEALTH | Age: 63
End: 2023-11-10
Payer: COMMERCIAL

## 2023-11-10 NOTE — HOME HEALTH
independence and reduce risk of falls with with ADLs and functional transfers along with strength, balance deficits, DME/AE training and fall prevention training to return to highest level of independent functioning. Patient to benefit from skilled OT services for 1d1 and 1w3 to address the above deficits with patient verbalizing understanding and in agreement with POC. Written Teaching Material Utilized: NA   Specific plan for next visit: Fall prevention and independence with toilet and shower transfers along with ADLs    Interdisciplinary communication with: Shivani Salazar PT for [address] [place] [address] collaboration     PCP:  Giovanni Johnson MD   Next scheduled doctor appointment:  11/14/23  Patient/Caregiver instructed to keep follow up appointment because lack of follow through with physician appointments could result in discontinuation of home care services for non-compliance. Patient/Caregiver verbalize knowledge of above through teach back with 100 percent accuracy.

## 2023-11-15 ENCOUNTER — HOME CARE VISIT (OUTPATIENT)
Facility: HOME HEALTH | Age: 63
End: 2023-11-15
Payer: COMMERCIAL

## 2023-11-15 PROCEDURE — G0151 HHCP-SERV OF PT,EA 15 MIN: HCPCS

## 2023-11-16 ENCOUNTER — HOME CARE VISIT (OUTPATIENT)
Facility: HOME HEALTH | Age: 63
End: 2023-11-16
Payer: COMMERCIAL

## 2023-11-16 VITALS
TEMPERATURE: 98 F | OXYGEN SATURATION: 98 % | SYSTOLIC BLOOD PRESSURE: 123 MMHG | DIASTOLIC BLOOD PRESSURE: 74 MMHG | HEART RATE: 73 BPM

## 2023-11-16 VITALS
OXYGEN SATURATION: 98 % | HEART RATE: 75 BPM | DIASTOLIC BLOOD PRESSURE: 64 MMHG | SYSTOLIC BLOOD PRESSURE: 118 MMHG | RESPIRATION RATE: 16 BRPM | TEMPERATURE: 98.5 F

## 2023-11-16 VITALS
OXYGEN SATURATION: 98 % | DIASTOLIC BLOOD PRESSURE: 70 MMHG | RESPIRATION RATE: 16 BRPM | SYSTOLIC BLOOD PRESSURE: 116 MMHG | HEART RATE: 72 BPM | TEMPERATURE: 97.6 F

## 2023-11-16 PROCEDURE — G0152 HHCP-SERV OF OT,EA 15 MIN: HCPCS

## 2023-11-16 PROCEDURE — G0299 HHS/HOSPICE OF RN EA 15 MIN: HCPCS

## 2023-11-16 ASSESSMENT — ENCOUNTER SYMPTOMS
PAIN LOCATION - PAIN QUALITY: ACHING
PAIN LOCATION - PAIN QUALITY: ACHING

## 2023-11-16 NOTE — HOME HEALTH
Subjective: I am going home in 2 weeks right after my next doctors appointment. I really think I dont need anymore home PT here  Falls since last visit No(if yes complete the Fall Tracking Form and include bsrifallreport):   Caregiver involvement changes: brother present for treatment and involved in care  Home health supplies by type and quantity ordered/delivered this visit include: na    Clinician asked if patient has had any physician contact since last home care visit and patient states: NO  Clinician asked if patient has any new or changed medications and patient states:  NO   If Yes, were medications reconciled? YES   Was the certifying physician notified of changes in medications? N/A     Clinical assessment (what this visit means for the patient overall and need for ongoing skilled care) and progress or lack of progress towards SPECIFIC goals: patient seen for PT re assessment and dc visit. she went to MD yesterday. he removed staples and knee immobilizer. incision healing well. education provided for pain mgt, fall prevention and use of oxycodone for pain and use of ice for pain. pt has good understanding of all educ. goal met. pt is ind in bed mobility, ind in transfers and ind in ambulation with rw maintaining TTWB left le. pt is ind in hep. tinetti improved from 6 to 16 out of 28. reviewed dc instructions brian carey.   all goals met    Written Teaching Material Utilized: reviewed home exercise program    Interdisciplinary communication with:  RN and  OT for the purpose of notice of PT discipline dc at patient request

## 2023-11-16 NOTE — HOME HEALTH
precautions along with completing UE HEP with independence. Written Teaching Material Utilized: UE HEP using theraband  Interdisciplinary communication with: SN Kimberly for POC collaboration   Discharge planning as follows: Discharge from [facility] OT services. Specific plan for next visit:Patient discharge from [facility] OT services with portion of goals met at this time with patient verbalizing understanding of discharge per her request. OT discharge instructions provided.

## 2023-11-16 NOTE — HOME HEALTH
Subjective: Patient states that she has pain in her knee  Falls since last visit No(if yes complete the Fall Tracking Form and include bsrifallreport):   Caregiver involvement changes: no  Home health supplies by type and quantity ordered/delivered this visit include: no    Clinician asked if patient has had any physician contact since last home care visit and patient states: N/A  Clinician asked if patient has any new or changed medications and patient states:  N/A   If Yes, were medications reconciled? N/A   Was the certifying physician notified of changes in medications? N/A     Clinical assessment (what this visit means for the patient overall and need for ongoing skilled care) and progress or lack of progress towards SPECIFIC goals: Patient states that she is doing much better and that the doctor took out sutures last week and her incision looks really good. SN needed for further eval and discharge. Written Teaching Material Utilized: N/A    Interdisciplinary communication with: N/A     Discharge planning as follows:  When goals are met    Specific plan for next visit: Amy Mathews in safety issues regarding Tripping hazards

## 2023-11-18 NOTE — HOME HEALTH
Reason for referral, [facility] SUMMARY of clinical condition: Koko Pugh admitted to home care for PT/OT/SN. Nursing needed for incision assessment, medication management and education. Clinical Assessment/Skilled reason for admission to home health (What this means for the patient overall and need for ongoing skilled care):64yo with PMH of osteopenia, type 1 DM with insulin pump and ETOH abuse. Patient had a GLF on 10/31/23 which resulted in left open distal femur fracture with hematoma. patient underwent ORIF and ID of left let. patient had post op anemia that required PRBC transfustion as well as mild hyponatremia. patient is toe touch weight bearing to left leg and is in an immobilizer. patient is using a wheelchair and a walker as assistive devices. Due to weight bearing status and patient unable to wheel self safely in wheelchair,  scored 5. ADL scores take into account ambulation score. patient does report pain with activity and some surgical pain. patient is using tylenol and oxycodone with adequate pain management. patient has a nonremovable surgical wound vac to left leg incision in place. patient made a follow up appointment but they gave her an appointment for the week of 11/20/23. This SN called and ortho follow up appointment moved to 11/14/23 @ 10am and patient and caregiver are aware. SN visits needed for incision assessment, medication management and education. VS within defined parameters, due to weight bearing status weight is unable to be obtained at [facility] visit, assessment completed, wound vac assessment completed. Diagnosis: encounter for other orthopedic aftercare    Subjective (statement from pt/cg that is relative to why you are there): \"I am ready to go back to my house in [facility]"    Caregiver: relative. Caregiver assists with: Medications, Meals, Bathing, ADL, IADL, Transportation and Housekeeping Caregiver unable to assist with: n/a.  Caregiver is available 24 hours/day

## 2023-11-21 ENCOUNTER — HOME CARE VISIT (OUTPATIENT)
Facility: HOME HEALTH | Age: 63
End: 2023-11-21
Payer: COMMERCIAL

## 2023-11-21 VITALS
TEMPERATURE: 98 F | DIASTOLIC BLOOD PRESSURE: 60 MMHG | HEART RATE: 79 BPM | SYSTOLIC BLOOD PRESSURE: 134 MMHG | OXYGEN SATURATION: 100 % | RESPIRATION RATE: 16 BRPM

## 2023-11-21 PROCEDURE — G0299 HHS/HOSPICE OF RN EA 15 MIN: HCPCS

## 2023-11-21 ASSESSMENT — ENCOUNTER SYMPTOMS
PAIN LOCATION - PAIN QUALITY: ACHING
CONSTIPATION: 1

## 2023-11-22 NOTE — HOME HEALTH
Subjective: \"I am feeling better but having some stiffness if I don't do my exercises\"  Falls since last visit: No (if yes complete the Fall Tracking Form and include bsrifallreport):   Caregiver involvement changes: n/a  Home health supplies by type and quantity ordered/delivered this visit include: n/a    Clinician asked if patient has had any physician contact since last home care visit and patient states: NO  Clinician asked if patient has any new or changed medications and patient states:  NO   If Yes, were medications reconciled? N/A   Was the certifying physician notified of changes in medications? N/A     Clinical assessment (what this visit means for the patient overall and need for ongoing skilled care) and progress or lack of progress towards SPECIFIC goals: 64yo patient required SN services for post surgical aftercare to left knee. Patient has progressed to meet educational and wound care goals and will be discharged from agency today. Patient has f/u OV with surgeon next week and plans to return home to [place] on 12/2/23. A list of reconciled medications has been given to the patient/caregiver  and uploaded to media. Written Teaching Material Utilized: admission handbook, med list     Instructed patient/caregiver on the following: Take all medications exactly as prescribed by physician.  Updated medication list present in the home at discharge, Keep all scheduled medical appointments, Wash hands frequently to control the spread of infection, Follow safety and fall prevention education to prevent falls and Continue home exercises as instructed by your therapist    Call physician for: continuous pain, abnormal bleeding, high fever over 100.4 degrees, increased pain, new problem and frequent falls    The patient/caregiver expressed knowledge and understanding of Discharge Instructions      Interdisciplinary communication with: care team for the purpose of POC collaboration

## 2023-11-30 ASSESSMENT — ENCOUNTER SYMPTOMS: PAIN LOCATION - PAIN QUALITY: SORE; STIFF
